# Patient Record
Sex: FEMALE | Race: WHITE | NOT HISPANIC OR LATINO | Employment: FULL TIME | URBAN - METROPOLITAN AREA
[De-identification: names, ages, dates, MRNs, and addresses within clinical notes are randomized per-mention and may not be internally consistent; named-entity substitution may affect disease eponyms.]

---

## 2020-09-11 ENCOUNTER — OFFICE VISIT (OUTPATIENT)
Dept: FAMILY MEDICINE CLINIC | Facility: CLINIC | Age: 70
End: 2020-09-11
Payer: MEDICARE

## 2020-09-11 VITALS
OXYGEN SATURATION: 98 % | RESPIRATION RATE: 16 BRPM | HEART RATE: 86 BPM | SYSTOLIC BLOOD PRESSURE: 130 MMHG | BODY MASS INDEX: 28.3 KG/M2 | HEIGHT: 59 IN | DIASTOLIC BLOOD PRESSURE: 80 MMHG | TEMPERATURE: 98.1 F | WEIGHT: 140.4 LBS

## 2020-09-11 DIAGNOSIS — Z11.4 SCREENING FOR HIV (HUMAN IMMUNODEFICIENCY VIRUS): ICD-10-CM

## 2020-09-11 DIAGNOSIS — M25.561 CHRONIC PAIN OF RIGHT KNEE: ICD-10-CM

## 2020-09-11 DIAGNOSIS — C50.412 MALIGNANT NEOPLASM OF UPPER-OUTER QUADRANT OF LEFT BREAST IN FEMALE, ESTROGEN RECEPTOR POSITIVE (HCC): ICD-10-CM

## 2020-09-11 DIAGNOSIS — Z12.11 SCREEN FOR COLON CANCER: ICD-10-CM

## 2020-09-11 DIAGNOSIS — I48.0 PAF (PAROXYSMAL ATRIAL FIBRILLATION) (HCC): ICD-10-CM

## 2020-09-11 DIAGNOSIS — Z17.0 MALIGNANT NEOPLASM OF UPPER-OUTER QUADRANT OF LEFT BREAST IN FEMALE, ESTROGEN RECEPTOR POSITIVE (HCC): ICD-10-CM

## 2020-09-11 DIAGNOSIS — G89.29 CHRONIC PAIN OF RIGHT KNEE: ICD-10-CM

## 2020-09-11 DIAGNOSIS — I10 BENIGN ESSENTIAL HYPERTENSION: Primary | ICD-10-CM

## 2020-09-11 DIAGNOSIS — Z11.59 NEED FOR HEPATITIS C SCREENING TEST: ICD-10-CM

## 2020-09-11 DIAGNOSIS — R73.01 IFG (IMPAIRED FASTING GLUCOSE): ICD-10-CM

## 2020-09-11 PROBLEM — G24.3 CERVICAL DYSTONIA: Status: ACTIVE | Noted: 2017-10-06

## 2020-09-11 PROBLEM — M85.80 OSTEOPENIA: Status: ACTIVE | Noted: 2019-08-23

## 2020-09-11 PROCEDURE — 99214 OFFICE O/P EST MOD 30 MIN: CPT | Performed by: FAMILY MEDICINE

## 2020-09-11 RX ORDER — LISINOPRIL 10 MG/1
10 TABLET ORAL DAILY
COMMUNITY
Start: 2020-09-09

## 2020-09-11 RX ORDER — ANASTROZOLE 1 MG/1
1 TABLET ORAL DAILY
COMMUNITY
Start: 2020-06-25 | End: 2020-09-11

## 2020-09-11 RX ORDER — ANASTROZOLE 1 MG/1
1 TABLET ORAL DAILY
COMMUNITY
Start: 2020-06-25

## 2020-09-11 NOTE — PROGRESS NOTES
BMI Counseling: Body mass index is 27 93 kg/m²  The BMI is above normal  Nutrition recommendations include decreasing portion sizes and encouraging healthy choices of fruits and vegetables  Exercise recommendations include exercising 3-5 times per week  Assessment/Plan:    No problem-specific Assessment & Plan notes found for this encounter  Diagnoses and all orders for this visit:    Benign essential hypertension  Comments:  well-controlled  continue lisinopril daily  labs as ordered  Orders:  -     Lipid panel; Future    IFG (impaired fasting glucose)  Comments:  check labs as ordered    Orders:  -     Hemoglobin A1C; Future    PAF (paroxysmal atrial fibrillation) (HCC)  Comments:  remote hx  currently on no meds  seeing hira at [facility]  Orders:  -     TSH, 3rd generation with Free T4 reflex; Future    Malignant neoplasm of upper-outer quadrant of left breast in female, estrogen receptor positive ([facility] [address] )  Comments:  discussed risk of recurrance increasing off arimidex  I don't think this was the cause of her knee pain  advised restarting  she will    Chronic pain of right knee  Comments:  refer to ortho  I do not believe this was from her arimidex and advised her to restart med and let me know how it goes    Screen for colon cancer  -     Cologuard; Future    Screening for HIV (human immunodeficiency virus)    Need for hepatitis C screening test    Other orders  -     Discontinue: anastrozole (ARIMIDEX) 1 mg tablet; Take 1 mg by mouth daily  -     anastrozole (ARIMIDEX) 1 mg tablet; Take 1 mg by mouth daily  -     lisinopril (ZESTRIL) 10 mg tablet; Take 10 mg by mouth daily  -     Hypromellose 0 5 % SOLN; Apply 1 drop to eye 2 (two) times a day as needed        Subjective:      Patient ID: Keke Negron is a 72 y o  female  HPI  Pt presents as new/old pt  Due for hep c screening, cologard  Declines all imm's  Pt seeing [facility] h/o for breast Ca  Had mammogram in July    Didn't have radiation at presentation 3 yrs ago  Did do surgery  She has been on arimidex for 3 yrs but stopped over the summer because she wondered whether it was giving her knee pain  Pt c/o knee pain R>L  Did a lot of gardening and was kneeling and pivoting alot this summer  No bruising or swelling that she noticed  Hadn't had knee probs on arimidex before  She started having problems with weightbearing in late spring/early summer  Went to urgent care and xray showed prepatellar effusion but was otherwise unremarkable  She stopped arimidex, and she started bracing and glucosamine in July  Over about the last week, she has noticed that pain is much-improved  Can walk up the stairs without severe pain  Pt has remote hx of PAF  Seeing HCG  Will need most recent note  No rate control or a/c  The following portions of the patient's history were reviewed and updated as appropriate: allergies, current medications, past family history, past medical history, past social history, past surgical history and problem list     Review of Systems   Constitutional: Negative for chills, fatigue, fever and unexpected weight change  HENT: Negative for congestion, ear pain, hearing loss, postnasal drip, rhinorrhea, sinus pressure, sinus pain, sore throat, trouble swallowing and voice change  Eyes: Negative for pain, redness and visual disturbance  Respiratory: Negative for cough and shortness of breath  Cardiovascular: Negative for chest pain, palpitations and leg swelling  Gastrointestinal: Negative for abdominal pain, constipation, diarrhea and nausea  Endocrine: Negative for cold intolerance, heat intolerance, polydipsia and polyuria  Genitourinary: Negative for dysuria, frequency and urgency  Musculoskeletal: Positive for arthralgias  Negative for joint swelling and myalgias  Skin: Negative for rash  No suspicious lesions   Neurological: Negative for weakness, numbness and headaches  Hematological: Negative for adenopathy  Objective:      /80   Pulse 86   Temp 98 1 °F (36 7 °C)   Resp 16   Ht 4' 11 45" (1 51 m)   Wt 63 7 kg (140 lb 6 4 oz)   SpO2 98%   BMI 27 93 kg/m²          Physical Exam  Constitutional:       General: She is not in acute distress  Appearance: She is well-developed  HENT:      Head: Normocephalic and atraumatic  Right Ear: Tympanic membrane, ear canal and external ear normal       Left Ear: Tympanic membrane, ear canal and external ear normal       Nose: Nose normal       Mouth/Throat:      Pharynx: No oropharyngeal exudate  Eyes:      Conjunctiva/sclera: Conjunctivae normal       Pupils: Pupils are equal, round, and reactive to light  Neck:      Thyroid: No thyromegaly  Vascular: No carotid bruit or JVD  Cardiovascular:      Rate and Rhythm: Regular rhythm  Heart sounds: S1 normal and S2 normal  No murmur  No friction rub  No gallop  No S3 or S4 sounds  Pulmonary:      Effort: Pulmonary effort is normal       Breath sounds: Normal breath sounds  No wheezing, rhonchi or rales  Abdominal:      General: Bowel sounds are normal  There is no distension  Palpations: Abdomen is soft  Tenderness: There is no abdominal tenderness  Musculoskeletal:      Comments: FROM b/l knees  +meniscal testing R knee negative CL/lachmans/ant/post drawer   Lymphadenopathy:      Cervical: No cervical adenopathy  Neurological:      Mental Status: She is alert and oriented to person, place, and time  Cranial Nerves: No cranial nerve deficit  Sensory: No sensory deficit  Deep Tendon Reflexes: Reflexes are normal and symmetric

## 2020-12-04 LAB — HBA1C MFR BLD HPLC: 6.2 %

## 2021-03-15 ENCOUNTER — TELEPHONE (OUTPATIENT)
Dept: FAMILY MEDICINE CLINIC | Facility: CLINIC | Age: 71
End: 2021-03-15

## 2021-03-31 ENCOUNTER — TELEPHONE (OUTPATIENT)
Dept: FAMILY MEDICINE CLINIC | Facility: CLINIC | Age: 71
End: 2021-03-31

## 2021-04-09 ENCOUNTER — HOSPITAL ENCOUNTER (OUTPATIENT)
Dept: RADIOLOGY | Facility: HOSPITAL | Age: 71
Discharge: HOME | End: 2021-04-09
Attending: FAMILY MEDICINE
Payer: MEDICARE

## 2021-04-09 ENCOUNTER — TELEPHONE (OUTPATIENT)
Dept: FAMILY MEDICINE CLINIC | Facility: CLINIC | Age: 71
End: 2021-04-09

## 2021-04-09 ENCOUNTER — OFFICE VISIT (OUTPATIENT)
Dept: FAMILY MEDICINE CLINIC | Facility: CLINIC | Age: 71
End: 2021-04-09
Payer: MEDICARE

## 2021-04-09 VITALS
HEART RATE: 76 BPM | BODY MASS INDEX: 26.97 KG/M2 | TEMPERATURE: 97.1 F | SYSTOLIC BLOOD PRESSURE: 118 MMHG | HEIGHT: 59 IN | RESPIRATION RATE: 20 BRPM | DIASTOLIC BLOOD PRESSURE: 86 MMHG | WEIGHT: 133.8 LBS

## 2021-04-09 DIAGNOSIS — Z78.0 POSTMENOPAUSAL STATUS: ICD-10-CM

## 2021-04-09 DIAGNOSIS — J40 BRONCHITIS: Primary | ICD-10-CM

## 2021-04-09 DIAGNOSIS — J40 BRONCHITIS: ICD-10-CM

## 2021-04-09 DIAGNOSIS — M54.50 CHRONIC BILATERAL LOW BACK PAIN WITHOUT SCIATICA: ICD-10-CM

## 2021-04-09 DIAGNOSIS — I48.0 PAF (PAROXYSMAL ATRIAL FIBRILLATION) (HCC): ICD-10-CM

## 2021-04-09 DIAGNOSIS — R73.01 IFG (IMPAIRED FASTING GLUCOSE): ICD-10-CM

## 2021-04-09 DIAGNOSIS — G89.29 CHRONIC BILATERAL LOW BACK PAIN WITHOUT SCIATICA: ICD-10-CM

## 2021-04-09 DIAGNOSIS — C50.412 MALIGNANT NEOPLASM OF UPPER-OUTER QUADRANT OF LEFT BREAST IN FEMALE, ESTROGEN RECEPTOR POSITIVE (HCC): ICD-10-CM

## 2021-04-09 DIAGNOSIS — Z00.00 MEDICARE ANNUAL WELLNESS VISIT, SUBSEQUENT: ICD-10-CM

## 2021-04-09 DIAGNOSIS — E78.2 MIXED HYPERLIPIDEMIA: ICD-10-CM

## 2021-04-09 DIAGNOSIS — I10 BENIGN ESSENTIAL HYPERTENSION: ICD-10-CM

## 2021-04-09 DIAGNOSIS — Z17.0 MALIGNANT NEOPLASM OF UPPER-OUTER QUADRANT OF LEFT BREAST IN FEMALE, ESTROGEN RECEPTOR POSITIVE (HCC): ICD-10-CM

## 2021-04-09 PROCEDURE — G0438 PPPS, INITIAL VISIT: HCPCS | Performed by: FAMILY MEDICINE

## 2021-04-09 PROCEDURE — 1123F ACP DISCUSS/DSCN MKR DOCD: CPT | Performed by: FAMILY MEDICINE

## 2021-04-09 PROCEDURE — 71046 X-RAY EXAM CHEST 2 VIEWS: CPT

## 2021-04-09 PROCEDURE — 99214 OFFICE O/P EST MOD 30 MIN: CPT | Performed by: FAMILY MEDICINE

## 2021-04-09 PROCEDURE — 72110 X-RAY EXAM L-2 SPINE 4/>VWS: CPT

## 2021-04-09 RX ORDER — CEFUROXIME AXETIL 500 MG/1
500 TABLET ORAL 2 TIMES DAILY
COMMUNITY
Start: 2021-04-03 | End: 2021-04-09

## 2021-04-09 RX ORDER — AZITHROMYCIN 250 MG/1
TABLET, FILM COATED ORAL
Qty: 6 TABLET | Refills: 0 | Status: SHIPPED | OUTPATIENT
Start: 2021-04-09 | End: 2021-04-13

## 2021-04-09 NOTE — PROGRESS NOTES
Assessment/Plan:    No problem-specific Assessment & Plan notes found for this encounter  Diagnoses and all orders for this visit:    Bronchitis  Comments:  pt declines albuterol given hx of PAF  obtain xray  will d/c ceftin and add other coverage, but want to see xray results first  covid negative  Orders:  -     XR chest pa & lateral; Future    IFG (impaired fasting glucose)  Comments:  stable  recheck in june  Orders:  -     Hemoglobin A1C; Future  -     Comprehensive metabolic panel; Future    PAF (paroxysmal atrial fibrillation) ([facility])  Comments:  hx of same  follows with cardiology  NSR today    Malignant neoplasm of upper-outer quadrant of left breast in female, estrogen receptor positive ([facility] [address] )  Comments:  up to date on mammograms  seeing h/o  on arimidex    Postmenopausal status  Comments:  +osteopenia  due for repeat dexa  Orders:  -     DXA bone density spine hip and pelvis; Future    Chronic bilateral low back pain without sciatica  Comments:  refer to PT    Orders:  -     Ambulatory referral to Physical Therapy; Future  -     XR spine lumbar minimum 4 views non injury; Future    Mixed hyperlipidemia  Comments:  pt refuses statin therapy     Benign essential hypertension  Comments:  controlled on current meds  continue same            Subjective:      Patient ID: Sol Craven is a 80 y o  female  HPI  Pt presents in routine f/u  Has had 11 days of URI/LRI symptoms  Started witn PND and cough from that but has settled in the chest at this point  Feels chest congestion  No fevers now  Went to urgent care last weekend and was given ceftin   covid negative in both rapid and other testing  She is a little better, but not much  Doesn't feel short of breath, but feels the infection in her chest   +thick nasal d/c and coughing up mucus  Pt's blood pressure is appropriate  Remote hx of PAF  Refused a/c  Seeing cardiology [facility])  She refuses cholesterol meds despite 10 yr risk      Seeing h/o for breast CA  Last mammogram at [facility] in July  Scheduled for next  a1c 6 2 4 mo ago  Will be due for recheck in June    +hx osteopenia  Due for recheck dexa    Pt has long-standing lower back pain  No radiation  B/l  No paresthesias or weakness  Going on for months  Doesn't recall an injury  The following portions of the patient's history were reviewed and updated as appropriate: allergies, current medications, past family history, past medical history, past social history, past surgical history and problem list     Review of Systems   Constitutional: Negative for chills, fatigue, fever and unexpected weight change  HENT: Positive for postnasal drip and sinus pressure  Negative for congestion, ear pain, hearing loss, rhinorrhea, sinus pain, sore throat, trouble swallowing and voice change  Eyes: Negative for pain, redness and visual disturbance  Respiratory: Positive for cough  Negative for shortness of breath  Cardiovascular: Negative for chest pain, palpitations and leg swelling  Gastrointestinal: Negative for abdominal pain, constipation, diarrhea and nausea  Endocrine: Negative for cold intolerance, heat intolerance, polydipsia and polyuria  Genitourinary: Negative for dysuria, frequency and urgency  Musculoskeletal: Positive for back pain  Negative for arthralgias, joint swelling and myalgias  Skin: Negative for rash  No suspicious lesions   Neurological: Negative for weakness, numbness and headaches  Hematological: Negative for adenopathy  Objective:      /86   Pulse 76   Temp (!) 97 1 °F (36 2 °C)   Resp 20   Ht 4' 11" (1 499 m)   Wt 60 7 kg (133 lb 12 8 oz)   BMI 27 02 kg/m²          Physical Exam  Constitutional:       General: She is not in acute distress  Appearance: She is well-developed  HENT:      Head: Normocephalic and atraumatic        Right Ear: Tympanic membrane, ear canal and external ear normal       Left Ear: Tympanic membrane, ear canal and external ear normal       Nose: Nose normal       Mouth/Throat:      Pharynx: No oropharyngeal exudate  Eyes:      Conjunctiva/sclera: Conjunctivae normal       Pupils: Pupils are equal, round, and reactive to light  Neck:      Thyroid: No thyromegaly  Vascular: No carotid bruit or JVD  Cardiovascular:      Rate and Rhythm: Regular rhythm  Heart sounds: S1 normal and S2 normal  No murmur  No friction rub  No gallop  No S3 or S4 sounds  Pulmonary:      Effort: Pulmonary effort is normal       Breath sounds: Rhonchi present  No wheezing or rales  Abdominal:      General: Bowel sounds are normal  There is no distension  Palpations: Abdomen is soft  Tenderness: There is no abdominal tenderness  Musculoskeletal:      Comments: +si tenderness b/l   Lymphadenopathy:      Cervical: No cervical adenopathy  Neurological:      Mental Status: She is alert and oriented to person, place, and time  Cranial Nerves: No cranial nerve deficit  Sensory: No sensory deficit  Deep Tendon Reflexes: Reflexes are normal and symmetric

## 2021-04-09 NOTE — TELEPHONE ENCOUNTER
April from [facility] is requesting a copy of the order for services faxed to [phone]

## 2021-04-09 NOTE — PATIENT INSTRUCTIONS
Labs in June  Xray chest and back  dexa scan when able      Medicare Preventive Visit Patient Instructions  Thank you for completing your Welcome to Medicare Visit or Medicare Annual Wellness Visit today  Your next wellness visit will be due in one year (4/10/2022)  The screening/preventive services that you may require over the next 5-10 years are detailed below  Some tests may not apply to you based off risk factors and/or age  Screening tests ordered at today's visit but not completed yet may show as past due  Also, please note that scanned in results may not display below  Preventive Screenings:  Service Recommendations Previous Testing/Comments   Colorectal Cancer Screening  * Colonoscopy    * Fecal Occult Blood Test (FOBT)/Fecal Immunochemical Test (FIT)  * Fecal DNA/Cologuard Test  * Flexible Sigmoidoscopy Age: 54-65 years old   Colonoscopy: every 10 years (may be performed more frequently if at higher risk)  OR  FOBT/FIT: every 1 year  OR  Cologuard: every 3 years  OR  Sigmoidoscopy: every 5 years  Screening may be recommended earlier than age 48 if at higher risk for colorectal cancer  Also, an individualized decision between you and your healthcare provider will decide whether screening between the ages of 74-80 would be appropriate  Colonoscopy: Not on file  FOBT/FIT: Not on file  Cologuard: 03/26/2021  Sigmoidoscopy: Not on file    Screening Current     Breast Cancer Screening Age: 36 years old  Frequency: every 1-2 years  Not required if history of left and right mastectomy Mammogram: Not on file    History Breast Cancer   Cervical Cancer Screening Between the ages of 21-29, pap smear recommended once every 3 years  Between the ages of 33-67, can perform pap smear with HPV co-testing every 5 years     Recommendations may differ for women with a history of total hysterectomy, cervical cancer, or abnormal pap smears in past  Pap Smear: Not on file    Screening Not Indicated   Hepatitis C Screening Once for adults born between [facility]  More frequently in patients at high risk for Hepatitis C Hep C Antibody: Not on file        Diabetes Screening 1-2 times per year if you're at risk for diabetes or have pre-diabetes Fasting glucose: No results in last 5 years   A1C: 6 2    Screening Current   Cholesterol Screening Once every 5 years if you don't have a lipid disorder  May order more often based on risk factors  Lipid panel: 08/02/2019    Screening Not Indicated  History Lipid Disorder     Other Preventive Screenings Covered by Medicare:  1  Abdominal Aortic Aneurysm (AAA) Screening: covered once if your at risk  You're considered to be at risk if you have a family history of AAA  2  Lung Cancer Screening: covers low dose CT scan once per year if you meet all of the following conditions: (1) Age 50-69; (2) No signs or symptoms of lung cancer; (3) Current smoker or have quit smoking within the last 15 years; (4) You have a tobacco smoking history of at least 30 pack years (packs per day multiplied by number of years you smoked); (5) You get a written order from a healthcare provider  3  Glaucoma Screening: covered annually if you're considered high risk: (1) You have diabetes OR (2) Family history of glaucoma OR (3)  aged 48 and older OR (3)  American aged 72 and older  3  Osteoporosis Screening: covered every 2 years if you meet one of the following conditions: (1) You're estrogen deficient and at risk for osteoporosis based off medical history and other findings; (2) Have a vertebral abnormality; (3) On glucocorticoid therapy for more than 3 months; (4) Have primary hyperparathyroidism; (5) On osteoporosis medications and need to assess response to drug therapy  · Last bone density test (DXA Scan): Not on file  5  HIV Screening: covered annually if you're between the age of 12-76   Also covered annually if you are younger than 13 and older than 72 with risk factors for HIV infection  For pregnant patients, it is covered up to 3 times per pregnancy  Immunizations:  Immunization Recommendations   Influenza Vaccine Annual influenza vaccination during flu season is recommended for all persons aged >= 6 months who do not have contraindications   Pneumococcal Vaccine (Prevnar and Pneumovax)  * Prevnar = PCV13  * Pneumovax = PPSV23   Adults 25-60 years old: 1-3 doses may be recommended based on certain risk factors  Adults 72 years old: Prevnar (PCV13) vaccine recommended followed by Pneumovax (PPSV23) vaccine  If already received PPSV23 since turning 65, then PCV13 recommended at least one year after PPSV23 dose  Hepatitis B Vaccine 3 dose series if at intermediate or high risk (ex: diabetes, end stage renal disease, liver disease)   Tetanus (Td) Vaccine - COST NOT COVERED BY MEDICARE PART B Following completion of primary series, a booster dose should be given every 10 years to maintain immunity against tetanus  Td may also be given as tetanus wound prophylaxis  Tdap Vaccine - COST NOT COVERED BY MEDICARE PART B Recommended at least once for all adults  For pregnant patients, recommended with each pregnancy  Shingles Vaccine (Shingrix) - COST NOT COVERED BY MEDICARE PART B  2 shot series recommended in those aged 48 and above     Health Maintenance Due:      Topic Date Due    Hepatitis C Screening  Never done    MAMMOGRAM  04/09/2022 (Originally 1950)    Colorectal Cancer Screening  03/26/2024     Immunizations Due:  There are no preventive care reminders to display for this patient  Advance Directives   What are advance directives? Advance directives are legal documents that state your wishes and plans for medical care  These plans are made ahead of time in case you lose your ability to make decisions for yourself  Advance directives can apply to any medical decision, such as the treatments you want, and if you want to donate organs     What are the types of advance directives? There are many types of advance directives, and each state has rules about how to use them  You may choose a combination of any of the following:  · Living will: This is a written record of the treatment you want  You can also choose which treatments you do not want, which to limit, and which to stop at a certain time  This includes surgery, medicine, IV fluid, and tube feedings  · Durable power of  for healthcare [facility]): This is a written record that states who you want to make healthcare choices for you when you are unable to make them for yourself  This person, called a proxy, is usually a family member or a friend  You may choose more than 1 proxy  · Do not resuscitate (DNR) order:  A DNR order is used in case your heart stops beating or you stop breathing  It is a request not to have certain forms of treatment, such as CPR  A DNR order may be included in other types of advance directives  · Medical directive: This covers the care that you want if you are in a coma, near death, or unable to make decisions for yourself  You can list the treatments you want for each condition  Treatment may include pain medicine, surgery, blood transfusions, dialysis, IV or tube feedings, and a ventilator (breathing machine)  · Values history: This document has questions about your views, beliefs, and how you feel and think about life  This information can help others choose the care that you would choose  Why are advance directives important? An advance directive helps you control your care  Although spoken wishes may be used, it is better to have your wishes written down  Spoken wishes can be misunderstood, or not followed  Treatments may be given even if you do not want them  An advance directive may make it easier for your family to make difficult choices about your care     Weight Management   Why it is important to manage your weight:  Being overweight increases your risk of health conditions such as heart disease, high blood pressure, type 2 diabetes, and certain types of cancer  It can also increase your risk for osteoarthritis, sleep apnea, and other respiratory problems  Aim for a slow, steady weight loss  Even a small amount of weight loss can lower your risk of health problems  How to lose weight safely:  A safe and healthy way to lose weight is to eat fewer calories and get regular exercise  You can lose up about 1 pound a week by decreasing the number of calories you eat by 500 calories each day  Healthy meal plan for weight management:  A healthy meal plan includes a variety of foods, contains fewer calories, and helps you stay healthy  A healthy meal plan includes the following:  · Eat whole-grain foods more often  A healthy meal plan should contain fiber  Fiber is the part of grains, fruits, and vegetables that is not broken down by your body  Whole-grain foods are healthy and provide extra fiber in your diet  Some examples of whole-grain foods are whole-wheat breads and pastas, oatmeal, brown rice, and bulgur  · Eat a variety of vegetables every day  Include dark, leafy greens such as spinach, kale, aleksandar greens, and mustard greens  Eat yellow and orange vegetables such as carrots, sweet potatoes, and winter squash  · Eat a variety of fruits every day  Choose fresh or canned fruit (canned in its own juice or light syrup) instead of juice  Fruit juice has very little or no fiber  · Eat low-fat dairy foods  Drink fat-free (skim) milk or 1% milk  Eat fat-free yogurt and low-fat cottage cheese  Try low-fat cheeses such as mozzarella and other reduced-fat cheeses  · Choose meat and other protein foods that are low in fat  Choose beans or other legumes such as split peas or lentils  Choose fish, skinless poultry (chicken or turkey), or lean cuts of red meat (beef or pork)  Before you cook meat or poultry, cut off any visible fat  · Use less fat and oil    Try baking foods instead of frying them  Add less fat, such as margarine, sour cream, regular salad dressing and mayonnaise to foods  Eat fewer high-fat foods  Some examples of high-fat foods include french fries, doughnuts, ice cream, and cakes  · Eat fewer sweets  Limit foods and drinks that are high in sugar  This includes candy, cookies, regular soda, and sweetened drinks  Exercise:  Exercise at least 30 minutes per day on most days of the week  Some examples of exercise include walking, biking, dancing, and swimming  You can also fit in more physical activity by taking the stairs instead of the elevator or parking farther away from stores  Ask your healthcare provider about the best exercise plan for you  © Copyright [employer] 2018 Information is for End User's use only and may not be sold, redistributed or otherwise used for commercial purposes   All illustrations and images included in CareNotes® are the copyrighted property of A D [employer] M , [employer]  or [address] [place] [address]

## 2021-04-09 NOTE — PROGRESS NOTES
Assessment and Plan:     Problem List Items Addressed This Visit        Medicare annual wellness visit, subsequent        obtain dexa  pt refuses pneumovax, covid 19, flu, tetanus shots  otherwise up to date preventively        BMI Counseling: Body mass index is 27 02 kg/m²  The BMI is above normal  Nutrition recommendations include encouraging healthy choices of fruits and vegetables  Exercise recommendations include exercising 3-5 times per week  Preventive health issues were discussed with patient, and age appropriate screening tests were ordered as noted in patient's After Visit Summary  Personalized health advice and appropriate referrals for health education or preventive services given if needed, as noted in patient's After Visit Summary       History of Present Illness:     Patient presents for Medicare Annual Wellness visit    Patient Care Team:  Rosemary Herrera DO as PCP - General (Family Medicine)     Problem List:     Patient Active Problem List   Diagnosis    Allergic rhinitis    Benign essential hypertension    Cervical dystonia    Diverticulitis of colon    Hyperlipidemia    Malignant neoplasm of upper-outer quadrant of left female breast ([facility] )    Osteopenia    Toxic diffuse goiter    PAF (paroxysmal atrial fibrillation) (HCC)      Past Medical and Surgical History:     Past Medical History:   Diagnosis Date    Allergic rhinitis     Anisocoria     Cervicalgia     Disorder of autonomic nervous system     Disorder of the autonomic nervous system, unspecified     Disorder of the autonomic nervous system, unspecified     Diverticulitis of colon     Dyspnea on exertion     Essential hypertension, benign     Hyperlipidemia     Low back pain     Malignant neoplasm of upper-outer quadrant of left female breast ([address] )     Clinical stage from 2/22/2017: Stage IIA (T2, N0, M0) - Signed by Tabitha De La Vega MD on 3/16/2018    Osteopenia     Paroxysmal atrial fibrillation ([address] )     Prediabetes     Toxic diffuse goiter     Now several years off methimazole with stable thyroid labs  Counseled on s/s of hyperthyroidism  Repeat thyroid labs 1-2 times per year or as needed with symptoms   Follow up as needed    Unspecified hearing loss, bilateral     Visual disturbances      Past Surgical History:   Procedure Laterality Date    BREAST LUMPECTOMY Left 03/28/2017 03/28/2017Serenity Trujillo      CERVICAL CONE BIOPSY      TONSILLECTOMY      WISDOM TOOTH EXTRACTION        Family History:     Family History   Problem Relation Age of Onset    Melanoma Daughter     Coronary artery disease Father     Diabetes Father     Breast cancer Mother     Throat cancer Maternal Grandfather     Coronary artery disease Paternal Grandfather     No Known Problems Maternal Grandmother     No Known Problems Daughter     No Known Problems Daughter       Social History:     E-Cigarette/Vaping    E-Cigarette Use Never User      E-Cigarette/Vaping Substances    Nicotine No     THC No     CBD No     Flavoring No      Social History     Socioeconomic History    Marital status: Single     Spouse name: None    Number of children: 4    Years of education: None    Highest education level: None   Occupational History    None   Social Needs    Financial resource strain: None    Food insecurity     Worry: None     Inability: None    Transportation needs     Medical: None     Non-medical: None   Tobacco Use    Smoking status: Never Smoker    Smokeless tobacco: Never Used   Substance and Sexual Activity    Alcohol use: Never     Frequency: Never    Drug use: Never    Sexual activity: Not Currently   Lifestyle    Physical activity     Days per week: None     Minutes per session: None    Stress: None   Relationships    Social connections     Talks on phone: None     Gets together: None     Attends Buddhist service: None     Active member of club or organization: None     Attends meetings of clubs or organizations: None     Relationship status: None    Intimate partner violence     Fear of current or ex partner: None     Emotionally abused: None     Physically abused: None     Forced sexual activity: None   Other Topics Concern    None   Social History Narrative    None      Medications and Allergies:     Current Outpatient Medications   Medication Sig Dispense Refill    anastrozole (ARIMIDEX) 1 mg tablet Take 1 mg by mouth daily      cefuroxime (CEFTIN) 500 mg tablet Take 500 mg by mouth 2 (two) times a day      Hypromellose 0 5 % SOLN Apply 1 drop to eye 2 (two) times a day as needed      lisinopril (ZESTRIL) 10 mg tablet Take 10 mg by mouth daily       No current facility-administered medications for this visit  Allergies   Allergen Reactions    Metoprolol Anaphylaxis    Other     Albumen, Egg - Food Allergy GI Intolerance    Ciprofloxacin      Leg pain, cramps    Esmolol Other (See Comments)     chest tightness    Metronidazole      A-fib      Immunizations: There is no immunization history on file for this patient  Health Maintenance:         Topic Date Due    Hepatitis C Screening  Never done    MAMMOGRAM  04/09/2022 (Originally 1950)    Colorectal Cancer Screening  03/26/2024     There are no preventive care reminders to display for this patient  Medicare Health Risk Assessment:     /86   Pulse 76   Temp (!) 97 1 °F (36 2 °C)   Resp 20   Ht 4' 11" (1 499 m)   Wt 60 7 kg (133 lb 12 8 oz)   BMI 27 02 kg/m²      Edgar Seaman is here for her Subsequent Wellness visit  Last Medicare Wellness visit information reviewed, patient interviewed and updates made to the record today  Health Risk Assessment:   Patient rates overall health as good  Patient feels that their physical health rating is much better  Patient is very satisfied with their life  Eyesight was rated as same  Hearing was rated as same   Patient feels that their emotional and mental health rating is much better  Patients states they are never, rarely angry  Patient states they are sometimes unusually tired/fatigued  Pain experienced in the last 7 days has been none  Patient states that she has experienced no weight loss or gain in last 6 months  Depression Screening:   PHQ-2 Score: 0      Fall Risk Screening: In the past year, patient has experienced: no history of falling in past year      Urinary Incontinence Screening:   Patient has not leaked urine accidently in the last six months  Home Safety:  Patient does not have trouble with stairs inside or outside of their home  Patient has working smoke alarms and has working carbon monoxide detector  Home safety hazards include: none  Nutrition:   Current diet is Regular  Medications:   Patient is not currently taking any over-the-counter supplements  Patient is able to manage medications  Activities of Daily Living (ADLs)/Instrumental Activities of Daily Living (IADLs):   Walk and transfer into and out of bed and chair?: Yes  Dress and groom yourself?: Yes    Bathe or shower yourself?: Yes    Feed yourself?  Yes  Do your laundry/housekeeping?: Yes  Manage your money, pay your bills and track your expenses?: Yes  Make your own meals?: Yes    Do your own shopping?: Yes    Previous Hospitalizations:   Any hospitalizations or ED visits within the last 12 months?: No      Advance Care Planning:   Living will: No    Advanced directive: Yes      PREVENTIVE SCREENINGS      Cardiovascular Screening:    General: Screening Not Indicated and History Lipid Disorder      Diabetes Screening:     General: Screening Current      Colorectal Cancer Screening:     General: Screening Current      Breast Cancer Screening:     General: History Breast Cancer      Cervical Cancer Screening:    General: Screening Not Indicated      Osteoporosis Screening:    General: Risks and Benefits Discussed    Due for: DXA Axial      Abdominal Aortic Aneurysm (AAA) Screening: General: Screening Not Indicated      Lung Cancer Screening:     General: Screening Not Indicated      Hepatitis C Screening:    General: Screening Current    Screening, Brief Intervention, and Referral to Treatment (SBIRT)    Screening      AUDIT-C Screenin) How often did you have a drink containing alcohol in the past year? never  2) How many drinks did you have on a typical day when you were drinking in the past year? never  3) How often did you have 6 or more drinks on one occasion in the past year? never    AUDIT-C Score: 0  Interpretation: Score 0-2 (female): Negative screen for alcohol misuse      Matthew Garza, DO

## 2021-04-12 ENCOUNTER — TELEPHONE (OUTPATIENT)
Dept: ADMINISTRATIVE | Facility: OTHER | Age: 71
End: 2021-04-12

## 2021-04-12 NOTE — TELEPHONE ENCOUNTER
Upon review of the In Basket request we were able to locate, review, and update the patient chart as requested for Mammogram     Any additional questions or concerns should be emailed to the Practice Liaisons via [email or web address] email, please do not reply via In Basket      Thank you  Alvina Colin MA

## 2021-04-12 NOTE — TELEPHONE ENCOUNTER
----- Message from Clara Cevallos LPN sent at [phone] 12:01 PM EDT -----  [facility]  21 12:01 PM    Hello, our patient Anson Rangel has had Mammogram completed/performed  Please assist in updating the patient chart by making an External outreach to Dr Cele Morales facility located in [facility]  The date of service is 2020      Thank you,  Clara Cevallos LPN  [facility]

## 2022-05-05 ENCOUNTER — RA CDI HCC (OUTPATIENT)
Dept: OTHER | Facility: HOSPITAL | Age: 72
End: 2022-05-05

## 2022-05-05 NOTE — PROGRESS NOTES
[id number]  [facility]  coding opportunities          Chart Reviewed number of suggestions sent to Provider: 1     Patients Insurance     Medicare Insurance: [facility]

## 2022-05-27 ENCOUNTER — OFFICE VISIT (OUTPATIENT)
Dept: FAMILY MEDICINE CLINIC | Facility: CLINIC | Age: 72
End: 2022-05-27
Payer: MEDICARE

## 2022-05-27 VITALS
DIASTOLIC BLOOD PRESSURE: 78 MMHG | RESPIRATION RATE: 16 BRPM | TEMPERATURE: 98.2 F | HEART RATE: 97 BPM | SYSTOLIC BLOOD PRESSURE: 138 MMHG | WEIGHT: 123.4 LBS | OXYGEN SATURATION: 97 % | HEIGHT: 60 IN | BODY MASS INDEX: 24.23 KG/M2

## 2022-05-27 DIAGNOSIS — Z00.00 MEDICARE ANNUAL WELLNESS VISIT, SUBSEQUENT: ICD-10-CM

## 2022-05-27 DIAGNOSIS — N64.9 BREAST LESION: Primary | ICD-10-CM

## 2022-05-27 DIAGNOSIS — Z17.0 MALIGNANT NEOPLASM OF UPPER-OUTER QUADRANT OF LEFT BREAST IN FEMALE, ESTROGEN RECEPTOR POSITIVE (HCC): ICD-10-CM

## 2022-05-27 DIAGNOSIS — I47.1 SVT (SUPRAVENTRICULAR TACHYCARDIA) (HCC): ICD-10-CM

## 2022-05-27 DIAGNOSIS — G25.2 DYSTONIC TREMOR: ICD-10-CM

## 2022-05-27 DIAGNOSIS — L30.9 DERMATITIS: ICD-10-CM

## 2022-05-27 DIAGNOSIS — Z78.0 POST-MENOPAUSAL: ICD-10-CM

## 2022-05-27 DIAGNOSIS — C50.412 MALIGNANT NEOPLASM OF UPPER-OUTER QUADRANT OF LEFT BREAST IN FEMALE, ESTROGEN RECEPTOR POSITIVE (HCC): ICD-10-CM

## 2022-05-27 DIAGNOSIS — E78.2 MIXED HYPERLIPIDEMIA: ICD-10-CM

## 2022-05-27 DIAGNOSIS — R73.01 IFG (IMPAIRED FASTING GLUCOSE): ICD-10-CM

## 2022-05-27 DIAGNOSIS — Z11.59 NEED FOR HEPATITIS C SCREENING TEST: ICD-10-CM

## 2022-05-27 PROCEDURE — 99214 OFFICE O/P EST MOD 30 MIN: CPT | Performed by: FAMILY MEDICINE

## 2022-05-27 PROCEDURE — 1123F ACP DISCUSS/DSCN MKR DOCD: CPT | Performed by: FAMILY MEDICINE

## 2022-05-27 PROCEDURE — G0439 PPPS, SUBSEQ VISIT: HCPCS | Performed by: FAMILY MEDICINE

## 2022-05-27 RX ORDER — TRIAMCINOLONE ACETONIDE 1 MG/G
CREAM TOPICAL 2 TIMES DAILY
Qty: 30 G | Refills: 0 | Status: SHIPPED | OUTPATIENT
Start: 2022-05-27

## 2022-05-27 NOTE — PATIENT INSTRUCTIONS
[phone] to schedule mammogram/US  Neuro at [facility] should call you within the next week to schedule  Obtain fasting labs  Twice daily triamcinolone cream to itchy spots on back        Medicare Preventive Visit Patient Instructions  Thank you for completing your Welcome to Medicare Visit or Medicare Annual Wellness Visit today  Your next wellness visit will be due in one year (5/28/2023)  The screening/preventive services that you may require over the next 5-10 years are detailed below  Some tests may not apply to you based off risk factors and/or age  Screening tests ordered at today's visit but not completed yet may show as past due  Also, please note that scanned in results may not display below  Preventive Screenings:  Service Recommendations Previous Testing/Comments   Colorectal Cancer Screening  * Colonoscopy    * Fecal Occult Blood Test (FOBT)/Fecal Immunochemical Test (FIT)  * Fecal DNA/Cologuard Test  * Flexible Sigmoidoscopy Age: 54-65 years old   Colonoscopy: every 10 years (may be performed more frequently if at higher risk)  OR  FOBT/FIT: every 1 year  OR  Cologuard: every 3 years  OR  Sigmoidoscopy: every 5 years  Screening may be recommended earlier than age 48 if at higher risk for colorectal cancer  Also, an individualized decision between you and your healthcare provider will decide whether screening between the ages of 74-80 would be appropriate  Colonoscopy: 08/23/2019  FOBT/FIT: Not on file  Cologuard: 03/26/2021  Sigmoidoscopy: Not on file    Screening Current     Breast Cancer Screening Age: 36 years old  Frequency: every 1-2 years  Not required if history of left and right mastectomy Mammogram: 07/23/2020    History Breast Cancer   Cervical Cancer Screening Between the ages of 21-29, pap smear recommended once every 3 years  Between the ages of 33-67, can perform pap smear with HPV co-testing every 5 years     Recommendations may differ for women with a history of total hysterectomy, cervical cancer, or abnormal pap smears in past  Pap Smear: Not on file    Screening Not Indicated   Hepatitis C Screening Once for adults born between 1945 and 1965  More frequently in patients at high risk for Hepatitis C Hep C Antibody: Not on file        Diabetes Screening 1-2 times per year if you're at risk for diabetes or have pre-diabetes Fasting glucose: No results in last 5 years   A1C: 5 9    Screening Current   Cholesterol Screening Once every 5 years if you don't have a lipid disorder  May order more often based on risk factors  Lipid panel: 08/02/2019    Screening Not Indicated  History Lipid Disorder     Other Preventive Screenings Covered by Medicare:  Abdominal Aortic Aneurysm (AAA) Screening: covered once if your at risk  You're considered to be at risk if you have a family history of AAA  Lung Cancer Screening: covers low dose CT scan once per year if you meet all of the following conditions: (1) Age 50-69; (2) No signs or symptoms of lung cancer; (3) Current smoker or have quit smoking within the last 15 years; (4) You have a tobacco smoking history of at least 30 pack years (packs per day multiplied by number of years you smoked); (5) You get a written order from a healthcare provider  Glaucoma Screening: covered annually if you're considered high risk: (1) You have diabetes OR (2) Family history of glaucoma OR (3)  aged 48 and older OR (3)  American aged 72 and older  Osteoporosis Screening: covered every 2 years if you meet one of the following conditions: (1) You're estrogen deficient and at risk for osteoporosis based off medical history and other findings; (2) Have a vertebral abnormality; (3) On glucocorticoid therapy for more than 3 months; (4) Have primary hyperparathyroidism; (5) On osteoporosis medications and need to assess response to drug therapy  Last bone density test (DXA Scan): Not on file    HIV Screening: covered annually if you're between the age of 12-76  Also covered annually if you are younger than 13 and older than 72 with risk factors for HIV infection  For pregnant patients, it is covered up to 3 times per pregnancy  Immunizations:  Immunization Recommendations   Influenza Vaccine Annual influenza vaccination during flu season is recommended for all persons aged >= 6 months who do not have contraindications   Pneumococcal Vaccine (Prevnar and Pneumovax)  * Prevnar = PCV13  * Pneumovax = PPSV23   Adults 25-60 years old: 1-3 doses may be recommended based on certain risk factors  Adults 72 years old: Prevnar (PCV13) vaccine recommended followed by Pneumovax (PPSV23) vaccine  If already received PPSV23 since turning 65, then PCV13 recommended at least one year after PPSV23 dose  Hepatitis B Vaccine 3 dose series if at intermediate or high risk (ex: diabetes, end stage renal disease, liver disease)   Tetanus (Td) Vaccine - COST NOT COVERED BY MEDICARE PART B Following completion of primary series, a booster dose should be given every 10 years to maintain immunity against tetanus  Td may also be given as tetanus wound prophylaxis  Tdap Vaccine - COST NOT COVERED BY MEDICARE PART B Recommended at least once for all adults  For pregnant patients, recommended with each pregnancy  Shingles Vaccine (Shingrix) - COST NOT COVERED BY MEDICARE PART B  2 shot series recommended in those aged 48 and above     Health Maintenance Due:      Topic Date Due    Hepatitis C Screening  Never done    Breast Cancer Screening: Mammogram  07/23/2021    Colorectal Cancer Screening  03/26/2024     Immunizations Due:      Topic Date Due    COVID-19 Vaccine (1) Never done    DTaP,Tdap,and Td Vaccines (1 - Tdap) Never done    Pneumococcal Vaccine: 65+ Years (1 - PCV) Never done     Advance Directives   What are advance directives? Advance directives are legal documents that state your wishes and plans for medical care   These plans are made ahead of time in case you lose your ability to make decisions for yourself  Advance directives can apply to any medical decision, such as the treatments you want, and if you want to donate organs  What are the types of advance directives? There are many types of advance directives, and each state has rules about how to use them  You may choose a combination of any of the following:  Living will: This is a written record of the treatment you want  You can also choose which treatments you do not want, which to limit, and which to stop at a certain time  This includes surgery, medicine, IV fluid, and tube feedings  Durable power of  for [facility]): This is a written record that states who you want to make healthcare choices for you when you are unable to make them for yourself  This person, called a proxy, is usually a family member or a friend  You may choose more than 1 proxy  Do not resuscitate (DNR) order:  A DNR order is used in case your heart stops beating or you stop breathing  It is a request not to have certain forms of treatment, such as CPR  A DNR order may be included in other types of advance directives  Medical directive: This covers the care that you want if you are in a coma, near death, or unable to make decisions for yourself  You can list the treatments you want for each condition  Treatment may include pain medicine, surgery, blood transfusions, dialysis, IV or tube feedings, and a ventilator (breathing machine)  Values history: This document has questions about your views, beliefs, and how you feel and think about life  This information can help others choose the care that you would choose  Why are advance directives important? An advance directive helps you control your care  Although spoken wishes may be used, it is better to have your wishes written down  Spoken wishes can be misunderstood, or not followed  Treatments may be given even if you do not want them   An advance directive may make it easier for your family to make difficult choices about your care  Fall Prevention    Fall prevention  includes ways to make your home and other areas safer  It also includes ways you can move more carefully to prevent a fall  Health conditions that cause changes in your blood pressure, vision, or muscle strength and coordination may increase your risk for falls  Medicines may also increase your risk for falls if they make you dizzy, weak, or sleepy  Fall prevention tips:   Stand or sit up slowly  Use assistive devices as directed  Wear shoes that fit well and have soles that   Wear a personal alarm  Stay active  Manage your medical conditions  Home Safety Tips:  Add items to prevent falls in the bathroom  Keep paths clear  Install bright lights in your home  Keep items you use often on shelves within reach  [place] or place reflective tape on the edges of your stairs  © Copyright [employer] 2018 Information is for End User's use only and may not be sold, redistributed or otherwise used for commercial purposes   All illustrations and images included in CareNotes® are the copyrighted property of A D A M , Inc  or [address] [place], [place] [address]

## 2022-05-27 NOTE — PROGRESS NOTES
Assessment and Plan:     Problem List Items Addressed This Visit            Medicare annual wellness visit, subsequent        healthy diet/exercise  pt declines all immunizations  obtain dexa      Post-menopausal        Relevant Orders    DXA bone density spine hip and pelvis    Need for hepatitis C screening test        Relevant Orders    Hepatitis C Antibody ([facility])          Depression Screening and Follow-up Plan: Patient was screened for depression during today's encounter  They screened negative with a PHQ-2 score of 0  Preventive health issues were discussed with patient, and age appropriate screening tests were ordered as noted in patient's After Visit Summary  Personalized health advice and appropriate referrals for health education or preventive services given if needed, as noted in patient's After Visit Summary  History of Present Illness:     Patient presents for Welcome to Medicare visit       Patient Care Team:  Patrizia Yo DO as PCP - General (Family Medicine)     Review of Systems:     Review of Systems   Problem List:     Patient Active Problem List   Diagnosis    Allergic rhinitis    Benign essential hypertension    Cervical dystonia    Diverticulitis of colon    Hyperlipidemia    Malignant neoplasm of upper-outer quadrant of left female breast ([address] )    Osteopenia    Toxic diffuse goiter    PAF (paroxysmal atrial fibrillation) ([address] )      Past Medical and Surgical History:     Past Medical History:   Diagnosis Date    Allergic rhinitis     Anisocoria     Cervicalgia     Disorder of autonomic nervous system     Disorder of the autonomic nervous system, unspecified     Disorder of the autonomic nervous system, unspecified     Diverticulitis of colon     Dyspnea on exertion     Essential hypertension, benign     Hyperlipidemia     Low back pain     Malignant neoplasm of upper-outer quadrant of left female breast ([address] )     Clinical stage from 2/22/2017: Stage IIA (T2, N0, M0) - Signed by Brisa Monson MD on 3/16/2018    Osteopenia     Paroxysmal atrial fibrillation (HCC)     Prediabetes     Toxic diffuse goiter     Now several years off methimazole with stable thyroid labs  Counseled on s/s of hyperthyroidism  Repeat thyroid labs 1-2 times per year or as needed with symptoms   Follow up as needed    Unspecified hearing loss, bilateral     Visual disturbances      Past Surgical History:   Procedure Laterality Date    BREAST LUMPECTOMY Left 03/28/2017 03/28/2017Serenity Trujillo      CERVICAL CONE BIOPSY      TONSILLECTOMY      WISDOM TOOTH EXTRACTION        Family History:     Family History   Problem Relation Age of Onset    Melanoma Daughter     Coronary artery disease Father     Diabetes Father     Breast cancer Mother     Throat cancer Maternal Grandfather     Coronary artery disease Paternal Grandfather     No Known Problems Maternal Grandmother     No Known Problems Daughter     No Known Problems Daughter       Social History:     Social History     Socioeconomic History    Marital status: Single     Spouse name: None    Number of children: 4    Years of education: None    Highest education level: None   Occupational History    None   Tobacco Use    Smoking status: Never Smoker    Smokeless tobacco: Never Used   Vaping Use    Vaping Use: Never used   Substance and Sexual Activity    Alcohol use: Never    Drug use: Never    Sexual activity: Not Currently   Other Topics Concern    None   Social History Narrative    None     Social Determinants of Health     Financial Resource Strain: Not on file   Food Insecurity: Not on file   Transportation Needs: Not on file   Physical Activity: Not on file   Stress: Not on file   Social Connections: Not on file   Intimate Partner Violence: Not on file   Housing Stability: Not on file      Medications and Allergies:     Current Outpatient Medications   Medication Sig Dispense Refill    anastrozole (ARIMIDEX) 1 mg tablet Take 1 mg by mouth daily      Hypromellose 0 5 % SOLN Apply 1 drop to eye 2 (two) times a day as needed      lisinopril (ZESTRIL) 10 mg tablet Take 10 mg by mouth daily      triamcinolone (KENALOG) 0 1 % cream Apply topically 2 (two) times a day 30 g 0     No current facility-administered medications for this visit  Allergies   Allergen Reactions    Metoprolol Anaphylaxis    Other     Albumen, Egg - Food Allergy GI Intolerance    Ciprofloxacin      Leg pain, cramps    Esmolol Other (See Comments)     chest tightness    Metronidazole      A-fib      Immunizations: There is no immunization history on file for this patient  Health Maintenance:         Topic Date Due    Hepatitis C Screening  Never done    Breast Cancer Screening: Mammogram  07/23/2021    Colorectal Cancer Screening  03/26/2024         Topic Date Due    COVID-19 Vaccine (1) Never done    DTaP,Tdap,and Td Vaccines (1 - Tdap) Never done    Pneumococcal Vaccine: 65+ Years (1 - PCV) Never done      Medicare Screening Tests and Risk Assessments:     Rafa Grimaldo is here for her Subsequent Wellness visit  Health Risk Assessment:   Patient rates overall health as good  Patient feels that their physical health rating is same  Patient is satisfied with their life  Eyesight was rated as slightly worse  Hearing was rated as slightly worse  Patient feels that their emotional and mental health rating is same  Patients states they are never, rarely angry  Patient states they are sometimes unusually tired/fatigued  Pain experienced in the last 7 days has been some  Patient's pain rating has been 5/10  Patient states that she has experienced no weight loss or gain in last 6 months  Depression Screening:   PHQ-2 Score: 0      Fall Risk Screening:    In the past year, patient has experienced: history of falling in past year    Number of falls: 1  Injured during fall?: No    Feels unsteady when standing or walking?: No Worried about falling?: No      Urinary Incontinence Screening:   Patient has not leaked urine accidently in the last six months  Home Safety:  Patient does not have trouble with stairs inside or outside of their home  Patient has working smoke alarms and has working carbon monoxide detector  Home safety hazards include: none  Nutrition:   Current diet is Regular  Medications:   Patient is not currently taking any over-the-counter supplements  Patient is able to manage medications  Activities of Daily Living (ADLs)/Instrumental Activities of Daily Living (IADLs):   Walk and transfer into and out of bed and chair?: Yes  Dress and groom yourself?: Yes    Bathe or shower yourself?: Yes    Feed yourself? Yes  Do your laundry/housekeeping?: Yes  Manage your money, pay your bills and track your expenses?: Yes  Make your own meals?: Yes    Do your own shopping?: Yes    Previous Hospitalizations:   Any hospitalizations or ED visits within the last 12 months?: Yes    How many hospitalizations have you had in the last year?: 1-2    Advance Care Planning:   Living will: Yes    Advanced directive: Yes      Cognitive Screening:   Provider or family/friend/caregiver concerned regarding cognition?: No    PREVENTIVE SCREENINGS      Cardiovascular Screening:    General: Screening Not Indicated and History Lipid Disorder      Diabetes Screening:     General: Screening Current      Colorectal Cancer Screening:     General: Screening Current      Breast Cancer Screening:     General: History Breast Cancer      Cervical Cancer Screening:    General: Screening Not Indicated      Osteoporosis Screening:    General: Risks and Benefits Discussed    Due for: DXA Axial      Abdominal Aortic Aneurysm (AAA) Screening:        General: Screening Not Indicated      Lung Cancer Screening:     General: Screening Not Indicated      Hepatitis C Screening:    General: Risks and Benefits Discussed    Hep C Screening Accepted:  Yes Screening, Brief Intervention, and Referral to Treatment (SBIRT)    Screening  Typical number of drinks in a day: 0  Typical number of drinks in a week: 0  Interpretation: Low risk drinking behavior      Single Item Drug Screening:  How often have you used an illegal drug (including marijuana) or a prescription medication for non-medical reasons in the past year? never    Single Item Drug Screen Score: 0  Interpretation: Negative screen for possible drug use disorder    No exam data present     Physical Exam:     /78   Pulse 97   Temp 98 2 °F (36 8 °C)   Resp 16   Ht 5' (1 524 m)   Wt 56 kg (123 lb 6 4 oz)   SpO2 97%   BMI 24 10 kg/m²     Physical Exam     Mardene Prior, DO

## 2022-05-27 NOTE — PROGRESS NOTES
Assessment/Plan:    No problem-specific Assessment & Plan notes found for this encounter  Diagnoses and all orders for this visit:    Breast lesion  Comments:  check mammogram/US  Orders:  -     Mammo diagnostic bilateral w cad; Future  -     US breast left limited (diagnostic); Future    Dystonic tremor  Comments:  discussed with pt  refer to neuro  Orders:  -     Ambulatory Referral to Neurology; Future    SVT (supraventricular tachycardia) (HCC)  -     TSH, 3rd generation; Future  -     CBC and differential; Future    Malignant neoplasm of upper-outer quadrant of left breast in female, estrogen receptor positive ([facility] [address] )  Comments:  following with h/o  overdue with surgery  obtain mammogram/US given lesion    IFG (impaired fasting glucose)  -     Comprehensive metabolic panel; Future  -     Hemoglobin A1C; Future    Mixed hyperlipidemia  -     Lipid Panel with Direct LDL reflex; Future    Dermatitis  Comments:  exam is benign  triamcinolone BID for itching  if not improved, call  Orders:  -     triamcinolone (KENALOG) 0 1 % cream; Apply topically 2 (two) times a day      Subjective:      Patient ID: Caryl Thomson is a 71 y o  female  HPI  Pt presents in f/u on chronic issues  Pt notes L breast/axillary lump which she states has been present for a year and which she thinks has been getting larger  Saw h/o  Had mammogram   Hasn't followed up with breast surgeon since covid  Was painful in the past   Not painful now  +hx L sided breast CA  On anastrozole  Didn't get chemo or radiation  Pt has head/dystonic tremor  Was seen by neuro in the past   Didn't want to pursue tx  No sensation of getting stuck to the floor or sense of ambulatory dysfxn  No tremor in hands  No change in writing  Would like to think about treatment now as she thinks it may be getting worse  Pt has hx of SVT  Following with cards  No palp, chest pain  Pt has hx of igf and HLD  Due for labs      Pt has itchy lesion on back  The following portions of the patient's history were reviewed and updated as appropriate: allergies, current medications, past family history, past medical history, past social history, past surgical history and problem list     Review of Systems   Constitutional: Negative for chills, fatigue, fever and unexpected weight change  HENT: Negative for congestion, ear pain, hearing loss, postnasal drip, rhinorrhea, sinus pressure, sinus pain, sore throat, trouble swallowing and voice change  Eyes: Negative for pain, redness and visual disturbance  Respiratory: Negative for cough and shortness of breath  Cardiovascular: Negative for chest pain, palpitations and leg swelling  Gastrointestinal: Negative for abdominal pain, constipation, diarrhea and nausea  Endocrine: Negative for cold intolerance, heat intolerance, polydipsia and polyuria  Genitourinary: Negative for dysuria, frequency and urgency  Musculoskeletal: Negative for arthralgias, joint swelling and myalgias  Skin: Negative for rash  No suspicious lesions   Neurological: Positive for tremors  Negative for weakness, numbness and headaches  Hematological: Negative for adenopathy  Objective:      /78   Pulse 97   Temp 98 2 °F (36 8 °C)   Resp 16   Ht 5' (1 524 m)   Wt 56 kg (123 lb 6 4 oz)   SpO2 97%   BMI 24 10 kg/m²          Physical Exam  Constitutional:       General: She is not in acute distress  Appearance: She is well-developed  HENT:      Head: Normocephalic and atraumatic  Right Ear: Tympanic membrane, ear canal and external ear normal       Left Ear: Tympanic membrane, ear canal and external ear normal       Nose: Nose normal       Mouth/Throat:      Pharynx: No oropharyngeal exudate  Eyes:      Conjunctiva/sclera: Conjunctivae normal       Pupils: Pupils are equal, round, and reactive to light  Neck:      Thyroid: No thyromegaly  Vascular: No carotid bruit or JVD  Cardiovascular:      Rate and Rhythm: Regular rhythm  Heart sounds: S1 normal and S2 normal  No murmur heard  No friction rub  No gallop  No S3 or S4 sounds  Pulmonary:      Effort: Pulmonary effort is normal       Breath sounds: Normal breath sounds  No wheezing, rhonchi or rales  Chest:   Breasts:      Right: No axillary adenopathy  Left: No axillary adenopathy  Abdominal:      General: Bowel sounds are normal  There is no distension  Palpations: Abdomen is soft  Tenderness: There is no abdominal tenderness  Lymphadenopathy:      Cervical: No cervical adenopathy  Upper Body:      Right upper body: No axillary adenopathy  Left upper body: No axillary adenopathy  Skin:     Comments: Itchy skin lesion appears to be nml seborrheic keratosis (approx 6csa1km)  +excoriations   Neurological:      Mental Status: She is alert and oriented to person, place, and time  Cranial Nerves: No cranial nerve deficit  Sensory: No sensory deficit  Motor: Tremor present  No weakness, abnormal muscle tone or pronator drift  Coordination: Romberg sign negative  Coordination normal  Finger-Nose-Finger Test normal  Rapid alternating movements normal       Gait: Gait is intact  Deep Tendon Reflexes: Reflexes are normal and symmetric        Comments: Dystonic head tremor  No cogwheeling rigidity

## 2022-06-08 ENCOUNTER — TELEPHONE (OUTPATIENT)
Dept: NEUROLOGY | Facility: CLINIC | Age: 72
End: 2022-06-08

## 2022-06-20 DIAGNOSIS — N64.9 BREAST LESION: ICD-10-CM

## 2022-06-30 ENCOUNTER — TELEPHONE (OUTPATIENT)
Dept: FAMILY MEDICINE CLINIC | Facility: CLINIC | Age: 72
End: 2022-06-30

## 2022-06-30 DIAGNOSIS — N64.9 BREAST LESION: Primary | ICD-10-CM

## 2022-06-30 DIAGNOSIS — R92.8 OTHER ABNORMAL AND INCONCLUSIVE FINDINGS ON DIAGNOSTIC IMAGING OF BREAST: ICD-10-CM

## 2022-06-30 NOTE — TELEPHONE ENCOUNTER
Maria from [facility] called they need two scripts, one for a left breast cyst aspiration, and one for a left breast u/s guided biopsy, she is unsure what they will have to do yet, please fax to [phone]

## 2022-07-13 LAB
HBA1C MFR BLD HPLC: 5.9 %
HCV AB SER-ACNC: NEGATIVE

## 2022-10-03 ENCOUNTER — TELEPHONE (OUTPATIENT)
Dept: NEUROLOGY | Facility: CLINIC | Age: 72
End: 2022-10-03

## 2022-10-03 NOTE — TELEPHONE ENCOUNTER
Phoned pt to reschedule appt of 10/06//2022 with Dr Andres reza offer appt in [facility] location 10/07/2022 slot on hold

## 2022-12-02 ENCOUNTER — HOSPITAL ENCOUNTER (OUTPATIENT)
Dept: CT IMAGING | Facility: HOSPITAL | Age: 72
Discharge: HOME | End: 2022-12-02

## 2022-12-02 DIAGNOSIS — E78.5 HYPERLIPIDEMIA, UNSPECIFIED HYPERLIPIDEMIA TYPE: ICD-10-CM

## 2022-12-02 DIAGNOSIS — I10 ESSENTIAL HYPERTENSION, MALIGNANT: ICD-10-CM

## 2023-03-28 ENCOUNTER — TELEPHONE (OUTPATIENT)
Dept: FAMILY MEDICINE CLINIC | Facility: CLINIC | Age: 73
End: 2023-03-28

## 2023-03-28 NOTE — TELEPHONE ENCOUNTER
loose stools w/blood & vomiting on Saturday; loose stools on Sunday; ok on   Monday; loose stool with blood & clotting Tuesday AM Per appt Note       Patient was around her grandchildren that have the stomach bug  Patient states that her diarrhea is all blood stool coming out  Patient states that yesterday she was fine  Went to bed last night and had lower abd pain  Patient states that she sat down this morning and it was blood clots  Patient declined no more vomiting today  Declined fever today       Spoke with Dr Bell Heading patient  Needs to go the ED patient is agreeable and will be going to [facility]

## 2023-05-23 ENCOUNTER — RA CDI HCC (OUTPATIENT)
Dept: OTHER | Facility: HOSPITAL | Age: 73
End: 2023-05-23

## 2023-05-23 NOTE — PROGRESS NOTES
[facility]  coding opportunities          Chart Reviewed number of suggestions sent to Provider: 1     Patients Insurance     Medicare Insurance: [employer]

## 2023-05-27 ENCOUNTER — TELEPHONE (OUTPATIENT)
Dept: OTHER | Facility: OTHER | Age: 73
End: 2023-05-27

## 2023-05-27 NOTE — TELEPHONE ENCOUNTER
Patient called and canceled her appointment on 6/2 at 12:40 PM because she is unable to make the appointment due to she have to work, patient is asking if the office can give her back a call to reschedule her appointment, patient is asking if her appointment can be schedule for 6/15/,6/16 or 6/23 because she will be off of work those days

## 2023-07-07 ENCOUNTER — OFFICE VISIT (OUTPATIENT)
Dept: FAMILY MEDICINE CLINIC | Facility: CLINIC | Age: 73
End: 2023-07-07
Payer: MEDICARE

## 2023-07-07 VITALS
RESPIRATION RATE: 14 BRPM | SYSTOLIC BLOOD PRESSURE: 122 MMHG | BODY MASS INDEX: 25.52 KG/M2 | DIASTOLIC BLOOD PRESSURE: 80 MMHG | WEIGHT: 130 LBS | HEART RATE: 72 BPM | OXYGEN SATURATION: 99 % | TEMPERATURE: 98 F | HEIGHT: 60 IN

## 2023-07-07 DIAGNOSIS — I48.0 PAF (PAROXYSMAL ATRIAL FIBRILLATION) (HCC): ICD-10-CM

## 2023-07-07 DIAGNOSIS — G25.2 DYSTONIC TREMOR: ICD-10-CM

## 2023-07-07 DIAGNOSIS — M54.50 CHRONIC LOW BACK PAIN, UNSPECIFIED BACK PAIN LATERALITY, UNSPECIFIED WHETHER SCIATICA PRESENT: ICD-10-CM

## 2023-07-07 DIAGNOSIS — K52.9 COLITIS: ICD-10-CM

## 2023-07-07 DIAGNOSIS — E78.2 MIXED HYPERLIPIDEMIA: ICD-10-CM

## 2023-07-07 DIAGNOSIS — I10 BENIGN ESSENTIAL HYPERTENSION: ICD-10-CM

## 2023-07-07 DIAGNOSIS — G89.29 CHRONIC LOW BACK PAIN, UNSPECIFIED BACK PAIN LATERALITY, UNSPECIFIED WHETHER SCIATICA PRESENT: ICD-10-CM

## 2023-07-07 DIAGNOSIS — Z00.00 MEDICARE ANNUAL WELLNESS VISIT, SUBSEQUENT: ICD-10-CM

## 2023-07-07 DIAGNOSIS — E05.90 HYPERTHYROIDISM: ICD-10-CM

## 2023-07-07 DIAGNOSIS — M25.551 RIGHT HIP PAIN: ICD-10-CM

## 2023-07-07 DIAGNOSIS — Z17.0 MALIGNANT NEOPLASM OF UPPER-OUTER QUADRANT OF LEFT BREAST IN FEMALE, ESTROGEN RECEPTOR POSITIVE (HCC): Primary | ICD-10-CM

## 2023-07-07 DIAGNOSIS — R73.01 IFG (IMPAIRED FASTING GLUCOSE): ICD-10-CM

## 2023-07-07 DIAGNOSIS — M81.0 AGE-RELATED OSTEOPOROSIS WITHOUT CURRENT PATHOLOGICAL FRACTURE: ICD-10-CM

## 2023-07-07 DIAGNOSIS — C50.412 MALIGNANT NEOPLASM OF UPPER-OUTER QUADRANT OF LEFT BREAST IN FEMALE, ESTROGEN RECEPTOR POSITIVE (HCC): Primary | ICD-10-CM

## 2023-07-07 DIAGNOSIS — I47.1 SVT (SUPRAVENTRICULAR TACHYCARDIA) (HCC): ICD-10-CM

## 2023-07-07 PROCEDURE — G0439 PPPS, SUBSEQ VISIT: HCPCS | Performed by: FAMILY MEDICINE

## 2023-07-07 PROCEDURE — 99214 OFFICE O/P EST MOD 30 MIN: CPT | Performed by: FAMILY MEDICINE

## 2023-07-07 NOTE — PROGRESS NOTES
Assessment and Plan:     Problem List Items Addressed This Visit        Cardiovascular and Mediastinum    Benign essential hypertension  Well-controlled on current meds  Continue same  F/u 6 mo      PAF (paroxysmal atrial fibrillation) (HCC)  Remote hx of same  F/u with cardiology  No a/c at this time       Other    Hyperlipidemia  Check labs    Relevant Orders    Lipid panel    Malignant neoplasm of upper-outer quadrant of left female breast ([address]) - Primary  Pt refuses arimidex or faslodex  Understands that this increases risk of recurrence.     At this point, she needs mammogram/US  Make f/u appt with h/o at [facility] or I can refer her here  Doesn't want radiation/chemo      Relevant Orders    Comprehensive metabolic panel    CBC and differential    Mammo diagnostic bilateral w cad    US breast left limited (diagnostic)   Other Visit Diagnoses     Age-related osteoporosis without current pathological fracture      Discussed fosamax/prolia  Pt will consider but declines today      IFG (impaired fasting glucose)   Labs as ordered       Relevant Orders    Comprehensive metabolic panel    CBC and differential    Hemoglobin A1C    Hyperthyroidism      Hx of same  Labs as ordered    Relevant Orders    TSH, 3rd generation with Free T4 reflex    SVT (supraventricular tachycardia) ([address])      Hx of same  Continue f/u with cardiology      Dystonic tremor      Re-refer to neuro  Does not appear parkinsonian to me    Relevant Orders    Ambulatory Referral to Neurology    Colitis      Needs GI f/u   Refer to same      Relevant Orders    Ambulatory Referral to Gastroenterology    Right hip pain      Refer to PT    Relevant Orders    Ambulatory Referral to Physical Therapy    Chronic low back pain, unspecified back pain laterality, unspecified whether sciatica present      Refer to PT    Relevant Orders    Ambulatory Referral to Physical Therapy      Medicare annual wellness, subsequent   Pt declines imm's   Advised healthy diet and exercise as able   Mammogram/dexa are not preventive at this point   Up to date with crc screening   Aged out of cervical ca screening     Preventive health issues were discussed with patient, and age appropriate screening tests were ordered as noted in patient's After Visit Summary. Personalized health advice and appropriate referrals for health education or preventive services given if needed, as noted in patient's After Visit Summary. History of Present Illness:     Patient presents for a Medicare Wellness Visit    HPI     Pt presents for MAW and f/u    From preventive standpoint, pt due for labs, tdap, shingrix,covid, prevnar. She declines all immunizations. She tries to maintain activity and eat well. Up to date with crc screen. Aged out of cervical ca screening. Has had dexa. From a problem standpoint, pt has hx of breast CA sp recurrence in 2022. Declined faslodex at the time and wanted to keep taking arimidex (had recurrence on arimidex). She has now stopped arimidex because she felt it didn't work. She does not want to go on faslodex. She has not followed up with surgeon. She is soon due for mammogram.    Dexa shows osteoporosis. Declined prolia/reclast with h/o. Discussed fosamax. Pt declines today but will think about it. Pt was seen in ED at [facility] in march for lower GI bleed. CTA showed colitis. Was referred to GI but she hasn't gone as she states resident at ED told her she didn't have to. D/c instructions say to go. Pt has ongoing head tremor. Had appt with neuro, but they had to cancel, and then she couldn't come in due to work schedule. Needs to be referred again. Has seen dr Angelo Lim for hx of afib and HTN. Not on a/c. Recent coronary artery calcium score 0. Ongoing pain in lower back and R hip. Worse when she has been sitting and then stands. No radiation down leg. No paresthesias or weakness. Known DJD spine.     Patient Care Team:  Uri Hurley DO as PCP - General (Family Medicine)     Review of Systems:     Review of Systems   Constitutional: Negative for chills, fatigue, fever and unexpected weight change. HENT: Negative for congestion, ear pain, hearing loss, postnasal drip, rhinorrhea, sinus pressure, sinus pain, sore throat, trouble swallowing and voice change. Eyes: Negative for pain, redness and visual disturbance. Respiratory: Negative for cough and shortness of breath. Cardiovascular: Negative for chest pain, palpitations and leg swelling. Gastrointestinal: Negative for abdominal pain, constipation, diarrhea and nausea. Endocrine: Negative for cold intolerance, heat intolerance, polydipsia and polyuria. Genitourinary: Negative for dysuria, frequency and urgency. Musculoskeletal: Negative for arthralgias, joint swelling and myalgias. Skin: Negative for rash. No suspicious lesions   Neurological: Negative for weakness, numbness and headaches. Hematological: Negative for adenopathy.         Problem List:     Patient Active Problem List   Diagnosis   • Allergic rhinitis   • Benign essential hypertension   • Cervical dystonia   • Diverticulitis of colon   • Hyperlipidemia   • Malignant neoplasm of upper-outer quadrant of left female breast ([address])   • Osteopenia   • Toxic diffuse goiter   • PAF (paroxysmal atrial fibrillation) ([address])      Past Medical and Surgical History:     Past Medical History:   Diagnosis Date   • Allergic    • Allergic rhinitis    • Anisocoria    • Cancer ([address]) 2017    Left breast lumpectomy   • Cervicalgia    • Disease of thyroid gland    • Disorder of autonomic nervous system    • Disorder of the autonomic nervous system, unspecified    • Disorder of the autonomic nervous system, unspecified    • Diverticulitis of colon    • Dyspnea on exertion    • Essential hypertension, benign    • HL (hearing loss)    • Hyperlipidemia    • Hypertension    • Low back pain    • Malignant neoplasm of upper-outer quadrant of left female breast ([address] [facility])     Clinical stage from 2/22/2017: Stage IIA (T2, N0, M0) - Signed by Ovidio Darling MD on 3/16/2018   • Osteopenia    • Paroxysmal atrial fibrillation (HCC)    • Prediabetes    • Toxic diffuse goiter     Now several years off methimazole with stable thyroid labs. Counseled on s/s of hyperthyroidism. Repeat thyroid labs 1-2 times per year or as needed with symptoms.  Follow up as needed   • Unspecified hearing loss, bilateral    • Visual disturbances    • Visual impairment      Past Surgical History:   Procedure Laterality Date   • BREAST LUMPECTOMY Left 03/28/2017 03/28/2017LeVicki Trujillo     • CERVICAL CONE BIOPSY     • LYMPH NODE BIOPSY     • TONSILLECTOMY     • WISDOM TOOTH EXTRACTION        Family History:     Family History   Problem Relation Age of Onset   • Melanoma Daughter    • Coronary artery disease Father    • Diabetes Father    • Heart disease Father    • Breast cancer Mother    • Throat cancer Maternal Grandfather    • Coronary artery disease Paternal Grandfather    • No Known Problems Maternal Grandmother    • No Known Problems Daughter    • No Known Problems Daughter    • Asthma Brother       Social History:     Social History     Socioeconomic History   • Marital status: Single     Spouse name: None   • Number of children: 4   • Years of education: None   • Highest education level: None   Occupational History   • None   Tobacco Use   • Smoking status: Never   • Smokeless tobacco: Never   Vaping Use   • Vaping Use: Never used   Substance and Sexual Activity   • Alcohol use: Never   • Drug use: Never   • Sexual activity: Not Currently   Other Topics Concern   • None   Social History Narrative   • None     Social Determinants of Health     Financial Resource Strain: Low Risk  (7/7/2023)    Overall Financial Resource Strain (CARDIA)    • Difficulty of Paying Living Expenses: Not hard at all   Food Insecurity: Not on file   Transportation Needs: No Transportation Needs (7/7/2023)    PRAPARE - Transportation    • Lack of Transportation (Medical): No    • Lack of Transportation (Non-Medical): No   Physical Activity: Not on file   Stress: Not on file   Social Connections: Not on file   Intimate Partner Violence: Not on file   Housing Stability: Not on file      Medications and Allergies:     Current Outpatient Medications   Medication Sig Dispense Refill   • lisinopril (ZESTRIL) 10 mg tablet Take 5 mg by mouth daily     • triamcinolone (KENALOG) 0.1 % cream Apply topically 2 (two) times a day 30 g 0     No current facility-administered medications for this visit. Allergies   Allergen Reactions   • Metoprolol Anaphylaxis   • Other    • Albumen, Egg - Food Allergy GI Intolerance   • Ciprofloxacin      Leg pain, cramps   • Esmolol Other (See Comments)     chest tightness   • Ibuprofen Other (See Comments)     "Rapid heart rate"    • Metronidazole      A-fib   • Procaine Other (See Comments)     Rapid heart rate- "when used for tooth pulling"      Immunizations: There is no immunization history on file for this patient. Health Maintenance:         Topic Date Due   • Breast Cancer Screening: Mammogram  06/20/2023   • Colorectal Cancer Screening  03/26/2024   • Hepatitis C Screening  Completed         Topic Date Due   • COVID-19 Vaccine (1) Never done   • Pneumococcal Vaccine: 65+ Years (1 - PCV) Never done   • Influenza Vaccine (1) 09/01/2023      Medicare Screening Tests and Risk Assessments:     Miranda Martinez is here for her Subsequent Wellness visit. Health Risk Assessment:   Patient rates overall health as good. Patient feels that their physical health rating is same. Patient is satisfied with their life. Eyesight was rated as slightly worse. Hearing was rated as slightly worse. Patient feels that their emotional and mental health rating is same. Patients states they are never, rarely angry. Patient states they are sometimes unusually tired/fatigued.  Pain experienced in the last 7 days has been some. Patient's pain rating has been 2/10. Patient states that she has experienced no weight loss or gain in last 6 months. Depression Screening:   PHQ-2 Score: 0      Fall Risk Screening: In the past year, patient has experienced: no history of falling in past year      Urinary Incontinence Screening:   Patient has not leaked urine accidently in the last six months. Home Safety:  Patient does not have trouble with stairs inside or outside of their home. Patient has working smoke alarms and has working carbon monoxide detector. Home safety hazards include: none. Nutrition:   Current diet is Regular and Limited junk food. Medications:   Patient is not currently taking any over-the-counter supplements. Patient is able to manage medications. Activities of Daily Living (ADLs)/Instrumental Activities of Daily Living (IADLs):   Walk and transfer into and out of bed and chair?: Yes  Dress and groom yourself?: Yes    Bathe or shower yourself?: Yes    Feed yourself? Yes  Do your laundry/housekeeping?: Yes  Manage your money, pay your bills and track your expenses?: Yes  Make your own meals?: Yes    Do your own shopping?: Yes    Previous Hospitalizations:   Any hospitalizations or ED visits within the last 12 months?: Yes    How many hospitalizations have you had in the last year?: 1-2    Advance Care Planning:   Living will: Yes    Durable POA for healthcare:  Yes    Advanced directive: Yes      Cognitive Screening:   Provider or family/friend/caregiver concerned regarding cognition?: No    PREVENTIVE SCREENINGS      Cardiovascular Screening:    General: Screening Not Indicated and History Lipid Disorder      Diabetes Screening:     General: Screening Current      Colorectal Cancer Screening:     General: Screening Current      Breast Cancer Screening:     General: History Breast Cancer    Due for: Mammogram        Cervical Cancer Screening:    General: Screening Not Indicated Osteoporosis Screening:    General: Screening Not Indicated and History Osteoporosis      Abdominal Aortic Aneurysm (AAA) Screening:        General: Screening Not Indicated      Lung Cancer Screening:     General: Screening Not Indicated      Hepatitis C Screening:    General: Screening Current    Screening, Brief Intervention, and Referral to Treatment (SBIRT)    Screening  Typical number of drinks in a day: 0  Typical number of drinks in a week: 0  Interpretation: Low risk drinking behavior. AUDIT-C Screenin) How often did you have a drink containing alcohol in the past year? never  2) How many drinks did you have on a typical day when you were drinking in the past year? 0  3) How often did you have 6 or more drinks on one occasion in the past year? never    AUDIT-C Score: 0  Interpretation: Score 0-2 (female): Negative screen for alcohol misuse    Single Item Drug Screening:  How often have you used an illegal drug (including marijuana) or a prescription medication for non-medical reasons in the past year? never    Single Item Drug Screen Score: 0  Interpretation: Negative screen for possible drug use disorder    No results found. Physical Exam:     /80   Pulse 72   Temp 98 °F (36.7 °C)   Resp 14   Ht 5' (1.524 m)   Wt 59 kg (130 lb)   SpO2 99%   BMI 25.39 kg/m²     Physical Exam  Vitals and nursing note reviewed. Constitutional:       General: She is not in acute distress. Appearance: She is well-developed. HENT:      Head: Normocephalic and atraumatic. Right Ear: Tympanic membrane, ear canal and external ear normal.      Left Ear: Tympanic membrane, ear canal and external ear normal.      Nose: Nose normal. No congestion. Mouth/Throat:      Mouth: Mucous membranes are moist.      Pharynx: No oropharyngeal exudate or posterior oropharyngeal erythema. Eyes:      Extraocular Movements: Extraocular movements intact.       Conjunctiva/sclera: Conjunctivae normal.   Neck: Vascular: No carotid bruit. Cardiovascular:      Rate and Rhythm: Normal rate and regular rhythm. Heart sounds: No murmur heard. Pulmonary:      Effort: Pulmonary effort is normal. No respiratory distress. Breath sounds: Normal breath sounds. Abdominal:      Palpations: Abdomen is soft. Tenderness: There is no abdominal tenderness. Musculoskeletal:         General: No swelling. Cervical back: Neck supple. Lymphadenopathy:      Cervical: No cervical adenopathy. Skin:     General: Skin is warm and dry. Capillary Refill: Capillary refill takes less than 2 seconds. Neurological:      General: No focal deficit present. Mental Status: She is alert and oriented to person, place, and time. Comments: +head tremor. No cogwheeling rigidity. No pill-rolling tremor.    Psychiatric:         Mood and Affect: Mood normal.          Oren Lott,

## 2023-07-07 NOTE — PATIENT INSTRUCTIONS
Obtain mammogram and US  Consider fosamax or prolia for your osteoporosis and call me if you want to do anything  Refer to GI and PT  Obtain labs  Refer back to neuro      Medicare Preventive Visit Patient Instructions  Thank you for completing your Welcome to Medicare Visit or Medicare Annual Wellness Visit today. Your next wellness visit will be due in one year (7/7/2024). The screening/preventive services that you may require over the next 5-10 years are detailed below. Some tests may not apply to you based off risk factors and/or age. Screening tests ordered at today's visit but not completed yet may show as past due. Also, please note that scanned in results may not display below. Preventive Screenings:  Service Recommendations Previous Testing/Comments   Colorectal Cancer Screening  * Colonoscopy    * Fecal Occult Blood Test (FOBT)/Fecal Immunochemical Test (FIT)  * Fecal DNA/Cologuard Test  * Flexible Sigmoidoscopy Age: 43-73 years old   Colonoscopy: every 10 years (may be performed more frequently if at higher risk)  OR  FOBT/FIT: every 1 year  OR  Cologuard: every 3 years  OR  Sigmoidoscopy: every 5 years  Screening may be recommended earlier than age 39 if at higher risk for colorectal cancer. Also, an individualized decision between you and your healthcare provider will decide whether screening between the ages of 77-80 would be appropriate. Colonoscopy: 03/26/2021  FOBT/FIT: Not on file  Cologuard: 03/26/2021  Sigmoidoscopy: Not on file    Screening Current     Breast Cancer Screening Age: 36 years old  Frequency: every 1-2 years  Not required if history of left and right mastectomy Mammogram: 06/20/2022    History Breast Cancer   Cervical Cancer Screening Between the ages of 21-29, pap smear recommended once every 3 years. Between the ages of 32-69, can perform pap smear with HPV co-testing every 5 years.    Recommendations may differ for women with a history of total hysterectomy, cervical cancer, or abnormal pap smears in past. Pap Smear: Not on file    Screening Not Indicated   Hepatitis C Screening Once for adults born between 1945 and 1965  More frequently in patients at high risk for Hepatitis C Hep C Antibody: 07/13/2022    Screening Current   Diabetes Screening 1-2 times per year if you're at risk for diabetes or have pre-diabetes Fasting glucose: No results in last 5 years (No results in last 5 years)  A1C: 5.9 (7/13/2022)  Screening Current   Cholesterol Screening Once every 5 years if you don't have a lipid disorder. May order more often based on risk factors. Lipid panel: 08/02/2019    Screening Not Indicated  History Lipid Disorder     Other Preventive Screenings Covered by Medicare:  Abdominal Aortic Aneurysm (AAA) Screening: covered once if your at risk. You're considered to be at risk if you have a family history of AAA. Lung Cancer Screening: covers low dose CT scan once per year if you meet all of the following conditions: (1) Age 48-67; (2) No signs or symptoms of lung cancer; (3) Current smoker or have quit smoking within the last 15 years; (4) You have a tobacco smoking history of at least 20 pack years (packs per day multiplied by number of years you smoked); (5) You get a written order from a healthcare provider. Glaucoma Screening: covered annually if you're considered high risk: (1) You have diabetes OR (2) Family history of glaucoma OR (3)  aged 48 and older OR (3)  American aged 72 and older  Osteoporosis Screening: covered every 2 years if you meet one of the following conditions: (1) You're estrogen deficient and at risk for osteoporosis based off medical history and other findings; (2) Have a vertebral abnormality; (3) On glucocorticoid therapy for more than 3 months; (4) Have primary hyperparathyroidism; (5) On osteoporosis medications and need to assess response to drug therapy. Last bone density test (DXA Scan): Not on file.   HIV Screening: covered annually if you're between the age of 14-79. Also covered annually if you are younger than 13 and older than 72 with risk factors for HIV infection. For pregnant patients, it is covered up to 3 times per pregnancy. Immunizations:  Immunization Recommendations   Influenza Vaccine Annual influenza vaccination during flu season is recommended for all persons aged >= 6 months who do not have contraindications   Pneumococcal Vaccine   * Pneumococcal conjugate vaccine = PCV13 (Prevnar 13), PCV15 (Vaxneuvance), PCV20 (Prevnar 20)  * Pneumococcal polysaccharide vaccine = PPSV23 (Pneumovax) Adults 20-63 years old: 1-3 doses may be recommended based on certain risk factors  Adults 72 years old: 1-2 doses may be recommended based off what pneumonia vaccine you previously received   Hepatitis B Vaccine 3 dose series if at intermediate or high risk (ex: diabetes, end stage renal disease, liver disease)   Tetanus (Td) Vaccine - COST NOT COVERED BY MEDICARE PART B Following completion of primary series, a booster dose should be given every 10 years to maintain immunity against tetanus. Td may also be given as tetanus wound prophylaxis. Tdap Vaccine - COST NOT COVERED BY MEDICARE PART B Recommended at least once for all adults. For pregnant patients, recommended with each pregnancy. Shingles Vaccine (Shingrix) - COST NOT COVERED BY MEDICARE PART B  2 shot series recommended in those aged 48 and above     Health Maintenance Due:      Topic Date Due    Breast Cancer Screening: Mammogram  06/20/2023    Colorectal Cancer Screening  03/26/2024    Hepatitis C Screening  Completed     Immunizations Due:      Topic Date Due    COVID-19 Vaccine (1) Never done    Pneumococcal Vaccine: 65+ Years (1 - PCV) Never done    Influenza Vaccine (1) 09/01/2023     Advance Directives   What are advance directives? Advance directives are legal documents that state your wishes and plans for medical care.  These plans are made ahead of time in case you lose your ability to make decisions for yourself. Advance directives can apply to any medical decision, such as the treatments you want, and if you want to donate organs. What are the types of advance directives? There are many types of advance directives, and each state has rules about how to use them. You may choose a combination of any of the following:  Living will: This is a written record of the treatment you want. You can also choose which treatments you do not want, which to limit, and which to stop at a certain time. This includes surgery, medicine, IV fluid, and tube feedings. Durable power of  for [facility]): This is a written record that states who you want to make healthcare choices for you when you are unable to make them for yourself. This person, called a proxy, is usually a family member or a friend. You may choose more than 1 proxy. Do not resuscitate (DNR) order:  A DNR order is used in case your heart stops beating or you stop breathing. It is a request not to have certain forms of treatment, such as CPR. A DNR order may be included in other types of advance directives. Medical directive: This covers the care that you want if you are in a coma, near death, or unable to make decisions for yourself. You can list the treatments you want for each condition. Treatment may include pain medicine, surgery, blood transfusions, dialysis, IV or tube feedings, and a ventilator (breathing machine). Values history: This document has questions about your views, beliefs, and how you feel and think about life. This information can help others choose the care that you would choose. Why are advance directives important? An advance directive helps you control your care. Although spoken wishes may be used, it is better to have your wishes written down. Spoken wishes can be misunderstood, or not followed. Treatments may be given even if you do not want them.  An advance directive may make it easier for your family to make difficult choices about your care. © Copyright [employer] 2018 Information is for End User's use only and may not be sold, redistributed or otherwise used for commercial purposes.  All illustrations and images included in CareNotes® are the copyrighted property of [employer].D.A[employer]M., Inc[employer] or [address] [place] [address]

## 2023-07-10 ENCOUNTER — TELEPHONE (OUTPATIENT)
Dept: ADMINISTRATIVE | Facility: OTHER | Age: 73
End: 2023-07-10

## 2023-07-10 NOTE — TELEPHONE ENCOUNTER
Upon review of the In Basket request we were able to locate, review, and update the patient chart as requested for DEXA Scan and Mammogram.    Any additional questions or concerns should be emailed to the Practice Liaisons via the appropriate education email address, please do not reply via In Basket.     Thank you  Yazmin Turner

## 2023-07-10 NOTE — TELEPHONE ENCOUNTER
----- Message from Gaudencio Canseco sent at 7/7/2023  3:05 PM EDT -----  Regarding: care gap request  07/07/23 3:05 PM    Hello, our patient attached above has had DEXA Scan and Mammogram completed/performed. Please assist in updating the patient chart by pulling the Care Everywhere (CE) document. The date of service is 7/2022.      Thank you,  Emily Queen  [facility]

## 2023-07-13 ENCOUNTER — TELEPHONE (OUTPATIENT)
Dept: NEUROLOGY | Facility: CLINIC | Age: 73
End: 2023-07-13

## 2023-07-13 NOTE — TELEPHONE ENCOUNTER
Patient calling to schedule new patient appointment for tremors. No testing done. Triage intake sent.

## 2023-07-18 ENCOUNTER — APPOINTMENT (OUTPATIENT)
Dept: LAB | Facility: MEDICAL CENTER | Age: 73
End: 2023-07-18
Payer: MEDICARE

## 2023-07-18 DIAGNOSIS — Z17.0 MALIGNANT NEOPLASM OF UPPER-OUTER QUADRANT OF LEFT BREAST IN FEMALE, ESTROGEN RECEPTOR POSITIVE (HCC): ICD-10-CM

## 2023-07-18 DIAGNOSIS — E78.2 MIXED HYPERLIPIDEMIA: ICD-10-CM

## 2023-07-18 DIAGNOSIS — E05.90 HYPERTHYROIDISM: ICD-10-CM

## 2023-07-18 DIAGNOSIS — R73.01 IFG (IMPAIRED FASTING GLUCOSE): ICD-10-CM

## 2023-07-18 DIAGNOSIS — C50.412 MALIGNANT NEOPLASM OF UPPER-OUTER QUADRANT OF LEFT BREAST IN FEMALE, ESTROGEN RECEPTOR POSITIVE (HCC): ICD-10-CM

## 2023-07-18 LAB
ALBUMIN SERPL BCP-MCNC: 3.8 G/DL (ref 3.5–5)
ALP SERPL-CCNC: 89 U/L (ref 46–116)
ALT SERPL W P-5'-P-CCNC: 26 U/L (ref 12–78)
ANION GAP SERPL CALCULATED.3IONS-SCNC: 2 MMOL/L
AST SERPL W P-5'-P-CCNC: 16 U/L (ref 5–45)
BASOPHILS # BLD AUTO: 0.02 THOUSANDS/ÂΜL (ref 0–0.1)
BASOPHILS NFR BLD AUTO: 0 % (ref 0–1)
BILIRUB SERPL-MCNC: 1.47 MG/DL (ref 0.2–1)
BUN SERPL-MCNC: 16 MG/DL (ref 5–25)
CALCIUM SERPL-MCNC: 9.4 MG/DL (ref 8.3–10.1)
CHLORIDE SERPL-SCNC: 111 MMOL/L (ref 96–108)
CHOLEST SERPL-MCNC: 234 MG/DL
CO2 SERPL-SCNC: 28 MMOL/L (ref 21–32)
CREAT SERPL-MCNC: 0.79 MG/DL (ref 0.6–1.3)
EOSINOPHIL # BLD AUTO: 0.05 THOUSAND/ÂΜL (ref 0–0.61)
EOSINOPHIL NFR BLD AUTO: 1 % (ref 0–6)
ERYTHROCYTE [DISTWIDTH] IN BLOOD BY AUTOMATED COUNT: 12.6 % (ref 11.6–15.1)
EST. AVERAGE GLUCOSE BLD GHB EST-MCNC: 117 MG/DL
GFR SERPL CREATININE-BSD FRML MDRD: 75 ML/MIN/1.73SQ M
GLUCOSE P FAST SERPL-MCNC: 93 MG/DL (ref 65–99)
HBA1C MFR BLD: 5.7 %
HCT VFR BLD AUTO: 45.2 % (ref 34.8–46.1)
HDLC SERPL-MCNC: 77 MG/DL
HGB BLD-MCNC: 14.5 G/DL (ref 11.5–15.4)
IMM GRANULOCYTES # BLD AUTO: 0.01 THOUSAND/UL (ref 0–0.2)
IMM GRANULOCYTES NFR BLD AUTO: 0 % (ref 0–2)
LDLC SERPL CALC-MCNC: 145 MG/DL (ref 0–100)
LYMPHOCYTES # BLD AUTO: 1.81 THOUSANDS/ÂΜL (ref 0.6–4.47)
LYMPHOCYTES NFR BLD AUTO: 35 % (ref 14–44)
MCH RBC QN AUTO: 28.4 PG (ref 26.8–34.3)
MCHC RBC AUTO-ENTMCNC: 32.1 G/DL (ref 31.4–37.4)
MCV RBC AUTO: 89 FL (ref 82–98)
MONOCYTES # BLD AUTO: 0.49 THOUSAND/ÂΜL (ref 0.17–1.22)
MONOCYTES NFR BLD AUTO: 10 % (ref 4–12)
NEUTROPHILS # BLD AUTO: 2.75 THOUSANDS/ÂΜL (ref 1.85–7.62)
NEUTS SEG NFR BLD AUTO: 54 % (ref 43–75)
NONHDLC SERPL-MCNC: 157 MG/DL
NRBC BLD AUTO-RTO: 0 /100 WBCS
PLATELET # BLD AUTO: 200 THOUSANDS/UL (ref 149–390)
PMV BLD AUTO: 11.1 FL (ref 8.9–12.7)
POTASSIUM SERPL-SCNC: 4.6 MMOL/L (ref 3.5–5.3)
PROT SERPL-MCNC: 6.8 G/DL (ref 6.4–8.4)
RBC # BLD AUTO: 5.1 MILLION/UL (ref 3.81–5.12)
SODIUM SERPL-SCNC: 141 MMOL/L (ref 135–147)
TRIGL SERPL-MCNC: 62 MG/DL
TSH SERPL DL<=0.05 MIU/L-ACNC: 1.01 UIU/ML (ref 0.45–4.5)
WBC # BLD AUTO: 5.13 THOUSAND/UL (ref 4.31–10.16)

## 2023-07-18 PROCEDURE — 84443 ASSAY THYROID STIM HORMONE: CPT

## 2023-07-18 PROCEDURE — 80053 COMPREHEN METABOLIC PANEL: CPT

## 2023-07-18 PROCEDURE — 83036 HEMOGLOBIN GLYCOSYLATED A1C: CPT

## 2023-07-18 PROCEDURE — 85025 COMPLETE CBC W/AUTO DIFF WBC: CPT

## 2023-07-18 PROCEDURE — 80061 LIPID PANEL: CPT

## 2023-07-18 PROCEDURE — 36415 COLL VENOUS BLD VENIPUNCTURE: CPT

## 2023-08-25 DIAGNOSIS — C50.412 MALIGNANT NEOPLASM OF UPPER-OUTER QUADRANT OF LEFT BREAST IN FEMALE, ESTROGEN RECEPTOR POSITIVE: ICD-10-CM

## 2023-08-25 DIAGNOSIS — Z17.0 MALIGNANT NEOPLASM OF UPPER-OUTER QUADRANT OF LEFT BREAST IN FEMALE, ESTROGEN RECEPTOR POSITIVE: ICD-10-CM

## 2023-09-01 ENCOUNTER — OFFICE VISIT (OUTPATIENT)
Dept: GASTROENTEROLOGY | Facility: MEDICAL CENTER | Age: 73
End: 2023-09-01
Payer: MEDICARE

## 2023-09-01 VITALS
WEIGHT: 130.2 LBS | BODY MASS INDEX: 25.43 KG/M2 | DIASTOLIC BLOOD PRESSURE: 78 MMHG | HEART RATE: 71 BPM | SYSTOLIC BLOOD PRESSURE: 128 MMHG | TEMPERATURE: 97.8 F

## 2023-09-01 DIAGNOSIS — K55.9 ISCHEMIC COLITIS (HCC): ICD-10-CM

## 2023-09-01 DIAGNOSIS — K59.00 CONSTIPATION, UNSPECIFIED CONSTIPATION TYPE: Primary | ICD-10-CM

## 2023-09-01 DIAGNOSIS — Z12.11 SCREENING FOR MALIGNANT NEOPLASM OF COLON: ICD-10-CM

## 2023-09-01 PROCEDURE — 99204 OFFICE O/P NEW MOD 45 MIN: CPT | Performed by: STUDENT IN AN ORGANIZED HEALTH CARE EDUCATION/TRAINING PROGRAM

## 2023-09-02 NOTE — PROGRESS NOTES
[facility] - Outpatient Consultation  Deedee March 68 y.o. female MRN: [id number]  Encounter: [id number]      Assessment and Plan:    1. Constipation, unspecified constipation type    2. Ischemic colitis ([address])    3. Screening for malignant neoplasm of colon        68 y.o. female w/ hx of Afib not on AC, HTN, HLD, breast cancer 2017 on anastrazole s/p recurrence with lumpectomy 2022 (declining all further treatment including hormonal therapy and XRT) who is referred to GI for colitis in 3/2023. Patient's self-limited episode of abdominal pain, diarrhea, and hematochezia with left-sided colitis in 6/0179 is almost certainly from ischemic colitis, especially given the patient's age, sex, comorbidities, and chronic constipation. Given complete resolution of symptoms, this does not require further evaluation. Given that her last Cologuard was in 2021, she is technically up to date on CRC screening. I did offer colonoscopy, especially since the patient did have hematochezia with her ischemic colitis in 3/2023. However, patient is very reluctant, as she is worried that the bowel prep will cause her to have palpitations. The need for further CRC screening is also debatable given that she is declining further treatment for recurrent breast cancer. She will think about it but will defer for now. - Fiber supplementation  - Defer screening colonoscopy for now    No orders of the defined types were placed in this encounter.    ______________________________________________________________________    History of Present Illness:    Deedee March is a 68 y.o. female w/ hx of Afib not on AC, HTN, HLD, breast cancer 2017 on anastrazole s/p recurrence with lumpectomy 2022 (declining all further treatment including hormonal therapy and XRT) who is referred to GI for colitis in 3/2023. Patient reports being exposed to her granddaughter who had a diarrheal illness in 3/2023.  Five days later, she had diarrhea and severe LLQ pain followed by hematochezia. She went to the ER where CT showed mild left-sided colitis. This episode has completely resolved. She is back to her baseline of constipation with frequent straining during BMs. Last Cologuard 3/2021 was negative. Last colonoscopy was in the 1990s and reportedly normal. No family history of colon cancer. Review of Systems:  As per HPI. Otherwise negative. Historical Information   Past Medical History:   Diagnosis Date   • Allergic    • Allergic rhinitis    • Anisocoria    • Cancer ([address]) 2017    Left breast lumpectomy   • Cervicalgia    • Disease of thyroid gland    • Disorder of autonomic nervous system    • Disorder of the autonomic nervous system, unspecified    • Disorder of the autonomic nervous system, unspecified    • Diverticulitis of colon    • Dyspnea on exertion    • Essential hypertension, benign    • HL (hearing loss)    • Hyperlipidemia    • Hypertension    • Low back pain    • Malignant neoplasm of upper-outer quadrant of left female breast ([address])     Clinical stage from 2/22/2017: Stage IIA (T2, N0, M0) - Signed by Nasrin Nichole MD on 3/16/2018   • Osteopenia    • Paroxysmal atrial fibrillation (HCC)    • Prediabetes    • Toxic diffuse goiter     Now several years off methimazole with stable thyroid labs. Counseled on s/s of hyperthyroidism. Repeat thyroid labs 1-2 times per year or as needed with symptoms.  Follow up as needed   • Unspecified hearing loss, bilateral    • Visual disturbances    • Visual impairment      Past Surgical History:   Procedure Laterality Date   • BREAST LUMPECTOMY Left 03/28/2017 03/28/2017LeVicki Trujillo     • CERVICAL CONE BIOPSY     • LYMPH NODE BIOPSY     • TONSILLECTOMY     • WISDOM TOOTH EXTRACTION       Social History   Social History     Substance and Sexual Activity   Alcohol Use Never     Social History     Substance and Sexual Activity   Drug Use Never     Social History Tobacco Use   Smoking Status Never   Smokeless Tobacco Never     Family History   Problem Relation Age of Onset   • Melanoma Daughter    • Coronary artery disease Father    • Diabetes Father    • Heart disease Father    • Breast cancer Mother    • Throat cancer Maternal Grandfather    • Coronary artery disease Paternal Grandfather    • No Known Problems Maternal Grandmother    • No Known Problems Daughter    • No Known Problems Daughter    • Asthma Brother        Meds/Allergies       Current Outpatient Medications:   •  lisinopril (ZESTRIL) 10 mg tablet  •  triamcinolone (KENALOG) 0.1 % cream    Allergies   Allergen Reactions   • Metoprolol Anaphylaxis   • Other    • Albumen, Egg - Food Allergy GI Intolerance   • Ciprofloxacin      Leg pain, cramps   • Esmolol Other (See Comments)     chest tightness   • Ibuprofen Other (See Comments)     "Rapid heart rate"    • Metronidazole      A-fib   • Procaine Other (See Comments)     Rapid heart rate- "when used for tooth pulling"           Objective     Blood pressure 128/78, pulse 71, temperature 97.8 °F (36.6 °C), weight 59.1 kg (130 lb 3.2 oz). Body mass index is 25.43 kg/m².         Physical Exam:      General: No acute distress  Abdomen: Soft, non-tender, non-distended, normoactive bowel sounds  Neuro: Awake, alert, oriented x 3    Lab Results:   Lab Results   Component Value Date/Time    WBC 5.13 07/18/2023 10:47 AM    HGB 14.5 07/18/2023 10:47 AM     07/18/2023 10:47 AM    SODIUM 141 07/18/2023 10:47 AM    K 4.6 07/18/2023 10:47 AM     (H) 07/18/2023 10:47 AM    CO2 28 07/18/2023 10:47 AM    BUN 16 07/18/2023 10:47 AM    CREATININE 0.79 07/18/2023 10:47 AM    AST 16 07/18/2023 10:47 AM    ALT 26 07/18/2023 10:47 AM    ALKPHOS 89 07/18/2023 10:47 AM    TBILI 1.47 (H) 07/18/2023 10:47 AM    ALB 3.8 07/18/2023 10:47 AM

## 2024-01-04 ENCOUNTER — TELEPHONE (OUTPATIENT)
Dept: GASTROENTEROLOGY | Facility: MEDICAL CENTER | Age: 74
End: 2024-01-04

## 2024-05-01 ENCOUNTER — CONSULT (OUTPATIENT)
Dept: NEUROLOGY | Facility: CLINIC | Age: 74
End: 2024-05-01
Payer: MEDICARE

## 2024-05-01 VITALS
SYSTOLIC BLOOD PRESSURE: 136 MMHG | DIASTOLIC BLOOD PRESSURE: 70 MMHG | HEART RATE: 60 BPM | WEIGHT: 122 LBS | BODY MASS INDEX: 23.83 KG/M2

## 2024-05-01 DIAGNOSIS — G25.2 DYSTONIC TREMOR: ICD-10-CM

## 2024-05-01 DIAGNOSIS — G24.3 CERVICAL DYSTONIA: Primary | ICD-10-CM

## 2024-05-01 PROCEDURE — 99204 OFFICE O/P NEW MOD 45 MIN: CPT | Performed by: PSYCHIATRY & NEUROLOGY

## 2024-05-01 RX ORDER — BACLOFEN 10 MG/1
TABLET ORAL
Qty: 60 TABLET | Refills: 5 | Status: SHIPPED | OUTPATIENT
Start: 2024-05-01

## 2024-05-01 NOTE — PATIENT INSTRUCTIONS
Cervical dystonia    We reviewed treatment options including medications (baclofen, trihexyphenidyl) and neurotoxin injections to muscles of the neck. We discussed the potential benefits and risks of neurotoxin injections . Injections are to treat symptoms.  Potential side effects such as excessive weakness, spread of toxin causing dysphagia, infection and bleeding discussed. Injections

## 2024-05-01 NOTE — ASSESSMENT & PLAN NOTE
History slowly progressive head tremor over the past 5 years.  Tremor associated with sensation of slight pulling to the left.  Tremor worse when under stress.  On exam she is noted to have slight left torticollis with mild right lateral Tang.  Almost constant diagonal tremor.  Although no sensory tricks were noted by the patient.  When touching the back of her head tremor did not appear to dampen.  She has a history of cervical degenerative disease.    We reviewed treatment options including medications (baclofen, trihexyphenidyl) and neurotoxin injections to muscles of the neck. We discussed the potential benefits and risks of neurotoxin injections . Injections are to treat symptoms.  Potential side effects such as excessive weakness, spread of toxin causing dysphagia, infection and bleeding discussed.    At this time she is not interested in neurotoxin injections.  She did express interest in trying medication.  Will start baclofen 5 mg twice daily every morning and late afternoon.  After week she will increase it to 10 mg twice daily.  If ineffective she will contact the office via [facility] [facility] and we can provide instructions for further increase.  If developing side effects we will taper off and consider a trial of trihexyphenidyl.

## 2024-05-30 ENCOUNTER — OFFICE VISIT (OUTPATIENT)
Dept: GASTROENTEROLOGY | Facility: MEDICAL CENTER | Age: 74
End: 2024-05-30
Payer: MEDICARE

## 2024-05-30 VITALS
SYSTOLIC BLOOD PRESSURE: 146 MMHG | BODY MASS INDEX: 24.88 KG/M2 | HEART RATE: 65 BPM | TEMPERATURE: 97.8 F | WEIGHT: 127.4 LBS | DIASTOLIC BLOOD PRESSURE: 83 MMHG

## 2024-05-30 DIAGNOSIS — K59.00 CONSTIPATION, UNSPECIFIED CONSTIPATION TYPE: Primary | ICD-10-CM

## 2024-05-30 DIAGNOSIS — K55.9 ISCHEMIC COLITIS (HCC): ICD-10-CM

## 2024-05-30 DIAGNOSIS — Z12.11 SCREENING FOR MALIGNANT NEOPLASM OF COLON: ICD-10-CM

## 2024-05-30 PROCEDURE — 99214 OFFICE O/P EST MOD 30 MIN: CPT | Performed by: STUDENT IN AN ORGANIZED HEALTH CARE EDUCATION/TRAINING PROGRAM

## 2024-05-30 NOTE — PATIENT INSTRUCTIONS
- Start taking Miralax if needed. Miralax is not absorbed by the intestines, so it is very safe even with long-term use. Please mix one capful (17 grams) with an 8 ounce glass of water and drink every morning.

## 2024-05-30 NOTE — PROGRESS NOTES
[facility] - Outpatient Consultation  Fernanda Winston 74 y.o. female MRN: [id number]  Encounter: [id number]      Assessment and Plan:    1. Constipation, unspecified constipation type    2. Ischemic colitis (HCC)    3. Screening for malignant neoplasm of colon          74 y.o. female w/ hx of Afib not on AC, HTN, HLD, breast cancer 2017 s/p lumpectomy x 2 (most recently 2022) with concern for recurrence but declining all further treatment who presents for follow-up of constipation and ischemic colitis 3/2023.    Patient's self-limited episode of abdominal pain, diarrhea, and hematochezia with left-sided colitis in 3/2023 is almost certainly from ischemic colitis, especially given the patient's age, sex, comorbidities, and chronic constipation. Given complete resolution of symptoms, this does not require further evaluation.    Given that her last Cologuard was in 2021, she is technically due for CRC screening this year. I previously offered colonoscopy to follow-up hematochezia seen during her episode of ischemic colitis, but patient declined due to fear of palpitations from bowel prep.     However, at this point, I do not think patient will see any benefit from further CRC screening. She is already very reluctant to undergo colonoscopy. Furthermore, there is concern for recurrent breast cancer (refer to [facility] oncology note 3/2024), but she is declining all further treatment for breast cancer (even having stopped the Arimidex she was taking when the recurrence occurred).    - Consider Miralax if constipation does not improve with dietary changes and probiotics  - No further CRC screening recommended    PRN follow-up    No orders of the defined types were placed in this encounter.    ______________________________________________________________________    History of Present Illness:    Fernanda Winston is a 74 y.o. female w/ hx of Afib not on AC, HTN, HLD, breast cancer 2017 s/p  lumpectomy x 2 (most recently 2022) with concern for recurrence but declining all further treatment who presents for follow-up of constipation and ischemic colitis 3/2023.    Patient reports being exposed to her granddaughter who had a diarrheal illness in 3/2023. Five days later, she had diarrhea and severe LLQ pain followed by hematochezia. She went to the ER where CT showed mild left-sided colitis. This episode has completely resolved and most likely represents ischemic colitis.    She is back to her baseline of constipation. She tried fiber supplementation which did not help. Dietary changes were helpful although constipation seems to have recurred recently since she is on a diet to lose weight.    Last Cologuard 3/2021 was negative. Last colonoscopy was in the 1990s and reportedly normal. No family history of colon cancer.      Review of Systems:  As per HPI. Otherwise negative.      Historical Information   Past Medical History:   Diagnosis Date    Allergic     Allergic rhinitis     Anisocoria     Cancer (HCC) 2017    Left breast lumpectomy    Cervicalgia     Disease of thyroid gland     Disorder of autonomic nervous system     Disorder of the autonomic nervous system, unspecified     Disorder of the autonomic nervous system, unspecified     Diverticulitis of colon     Dyspnea on exertion     Essential hypertension, benign     HL (hearing loss)     Hyperlipidemia     Hypertension     Low back pain     Malignant neoplasm of upper-outer quadrant of left female breast (HCC)     Clinical stage from 2/22/2017: Stage IIA (T2, N0, M0) - Signed by Leslie Trujillo MD on 3/16/2018    Osteopenia     Paroxysmal atrial fibrillation (HCC)     Prediabetes     Toxic diffuse goiter     Now several years off methimazole with stable thyroid labs. Counseled on s/s of hyperthyroidism. Repeat thyroid labs 1-2 times per year or as needed with symptoms. Follow up as needed    Unspecified hearing loss, bilateral     Visual disturbances   "   Visual impairment      Past Surgical History:   Procedure Laterality Date    BREAST LUMPECTOMY Left 03/28/2017 03/28/2017LeVicki Leslie Trujillo      CERVICAL CONE BIOPSY      LYMPH NODE BIOPSY      TONSILLECTOMY      WISDOM TOOTH EXTRACTION       Social History   Social History     Substance and Sexual Activity   Alcohol Use Never     Social History     Substance and Sexual Activity   Drug Use Never     Social History     Tobacco Use   Smoking Status Never   Smokeless Tobacco Never     Family History   Problem Relation Age of Onset    Melanoma Daughter     Coronary artery disease Father     Diabetes Father     Heart disease Father     Breast cancer Mother     Throat cancer Maternal Grandfather     Coronary artery disease Paternal Grandfather     No Known Problems Maternal Grandmother     No Known Problems Daughter     No Known Problems Daughter     Asthma Brother        Meds/Allergies       Current Outpatient Medications:     baclofen 10 mg tablet    lisinopril (ZESTRIL) 10 mg tablet    triamcinolone (KENALOG) 0.1 % cream    Allergies   Allergen Reactions    Metoprolol Anaphylaxis    Other     Albumen, Egg - Food Allergy GI Intolerance    Ciprofloxacin      Leg pain, cramps    Esmolol Other (See Comments)     chest tightness    Ibuprofen Other (See Comments)     \"Rapid heart rate\"     Metronidazole      A-fib    Procaine Other (See Comments)     Rapid heart rate- \"when used for tooth pulling\"           Objective     Blood pressure 146/83, pulse 65, temperature 97.8 °F (36.6 °C), weight 57.8 kg (127 lb 6.4 oz). Body mass index is 24.88 kg/m².        Physical Exam:      General: No acute distress  Abdomen: Soft, non-tender, non-distended, normoactive bowel sounds  Neuro: Awake, alert, oriented x 3    Lab Results:   Lab Results   Component Value Date/Time    WBC 5.13 07/18/2023 10:47 AM    HGB 14.5 07/18/2023 10:47 AM     07/18/2023 10:47 AM    SODIUM 141 07/18/2023 10:47 AM    SODIUM 141 03/28/2023 02:07 PM "    K 4.6 07/18/2023 10:47 AM    K 3.6 03/28/2023 02:07 PM     (H) 07/18/2023 10:47 AM     03/28/2023 02:07 PM    CO2 28 07/18/2023 10:47 AM    CO2 26 03/28/2023 02:07 PM    BUN 16 07/18/2023 10:47 AM    BUN 27 (H) 03/28/2023 02:07 PM    CREATININE 0.79 07/18/2023 10:47 AM    CREATININE 0.71 03/28/2023 02:07 PM    AST 16 07/18/2023 10:47 AM    AST 22 03/28/2023 02:07 PM    ALT 26 07/18/2023 10:47 AM    ALT 21 03/28/2023 02:07 PM    ALKPHOS 89 07/18/2023 10:47 AM    ALKPHOS 73 03/28/2023 02:07 PM    TBILI 1.47 (H) 07/18/2023 10:47 AM    TBILI 1.8 (H) 03/28/2023 02:07 PM    ALB 3.8 07/18/2023 10:47 AM    ALB 4.1 03/28/2023 02:07 PM    INR 1.1 03/28/2023 03:23 PM

## 2024-07-08 ENCOUNTER — RA CDI HCC (OUTPATIENT)
Dept: OTHER | Facility: HOSPITAL | Age: 74
End: 2024-07-08

## 2024-07-08 NOTE — PROGRESS NOTES
I48.91  HCC coding opportunities          Chart Reviewed number of suggestions sent to Provider: 1     Patients Insurance     Medicare Insurance: Medicare

## 2024-07-16 ENCOUNTER — OFFICE VISIT (OUTPATIENT)
Dept: FAMILY MEDICINE CLINIC | Facility: CLINIC | Age: 74
End: 2024-07-16
Payer: MEDICARE

## 2024-07-16 VITALS
DIASTOLIC BLOOD PRESSURE: 84 MMHG | RESPIRATION RATE: 16 BRPM | HEART RATE: 67 BPM | BODY MASS INDEX: 23.95 KG/M2 | HEIGHT: 60 IN | OXYGEN SATURATION: 98 % | TEMPERATURE: 98.2 F | WEIGHT: 122 LBS | SYSTOLIC BLOOD PRESSURE: 126 MMHG

## 2024-07-16 DIAGNOSIS — C50.412 MALIGNANT NEOPLASM OF UPPER-OUTER QUADRANT OF LEFT BREAST IN FEMALE, ESTROGEN RECEPTOR POSITIVE (HCC): ICD-10-CM

## 2024-07-16 DIAGNOSIS — Z00.00 MEDICARE ANNUAL WELLNESS VISIT, SUBSEQUENT: Primary | ICD-10-CM

## 2024-07-16 DIAGNOSIS — Z17.0 MALIGNANT NEOPLASM OF UPPER-OUTER QUADRANT OF LEFT BREAST IN FEMALE, ESTROGEN RECEPTOR POSITIVE (HCC): ICD-10-CM

## 2024-07-16 DIAGNOSIS — I48.0 PAF (PAROXYSMAL ATRIAL FIBRILLATION) (HCC): ICD-10-CM

## 2024-07-16 DIAGNOSIS — I47.10 SVT (SUPRAVENTRICULAR TACHYCARDIA): ICD-10-CM

## 2024-07-16 DIAGNOSIS — R73.01 IFG (IMPAIRED FASTING GLUCOSE): ICD-10-CM

## 2024-07-16 DIAGNOSIS — Z85.3 HISTORY OF BREAST CANCER: ICD-10-CM

## 2024-07-16 DIAGNOSIS — Z13.220 SCREENING FOR LIPOID DISORDERS: ICD-10-CM

## 2024-07-16 DIAGNOSIS — Z12.11 SCREENING FOR COLORECTAL CANCER: ICD-10-CM

## 2024-07-16 DIAGNOSIS — Z12.12 SCREENING FOR COLORECTAL CANCER: ICD-10-CM

## 2024-07-16 PROCEDURE — G0439 PPPS, SUBSEQ VISIT: HCPCS | Performed by: FAMILY MEDICINE

## 2024-07-16 PROCEDURE — 99214 OFFICE O/P EST MOD 30 MIN: CPT | Performed by: FAMILY MEDICINE

## 2024-07-16 NOTE — PATIENT INSTRUCTIONS
Obtain cologuard  Message neurology  Refer to Dr. Yeager  Obtain labs      Medicare Preventive Visit Patient Instructions  Thank you for completing your Welcome to Medicare Visit or Medicare Annual Wellness Visit today. Your next wellness visit will be due in one year (7/17/2025).  The screening/preventive services that you may require over the next 5-10 years are detailed below. Some tests may not apply to you based off risk factors and/or age. Screening tests ordered at today's visit but not completed yet may show as past due. Also, please note that scanned in results may not display below.  Preventive Screenings:  Service Recommendations Previous Testing/Comments   Colorectal Cancer Screening  * Colonoscopy    * Fecal Occult Blood Test (FOBT)/Fecal Immunochemical Test (FIT)  * Fecal DNA/Cologuard Test  * Flexible Sigmoidoscopy Age: 45-75 years old   Colonoscopy: every 10 years (may be performed more frequently if at higher risk)  OR  FOBT/FIT: every 1 year  OR  Cologuard: every 3 years  OR  Sigmoidoscopy: every 5 years  Screening may be recommended earlier than age 45 if at higher risk for colorectal cancer. Also, an individualized decision between you and your healthcare provider will decide whether screening between the ages of 76-85 would be appropriate. Colonoscopy: Not on file  FOBT/FIT: Not on file  Cologuard: Not on file  Sigmoidoscopy: Not on file          Breast Cancer Screening Age: 40+ years old  Frequency: every 1-2 years  Not required if history of left and right mastectomy Mammogram: 08/23/2023    History Breast Cancer   Cervical Cancer Screening Between the ages of 21-29, pap smear recommended once every 3 years.   Between the ages of 30-65, can perform pap smear with HPV co-testing every 5 years.   Recommendations may differ for women with a history of total hysterectomy, cervical cancer, or abnormal pap smears in past. Pap Smear: Not on file    Screening Not Indicated   Hepatitis C Screening Once  for adults born between 1945 and 1965  More frequently in patients at high risk for Hepatitis C Hep C Antibody: 07/13/2022    Screening Current   Diabetes Screening 1-2 times per year if you're at risk for diabetes or have pre-diabetes Fasting glucose: 93 mg/dL (7/18/2023)  A1C: 5.7 % (7/18/2023)  Screening Current   Cholesterol Screening Once every 5 years if you don't have a lipid disorder. May order more often based on risk factors. Lipid panel: 07/18/2023    Screening Not Indicated  History Lipid Disorder     Other Preventive Screenings Covered by Medicare:  Abdominal Aortic Aneurysm (AAA) Screening: covered once if your at risk. You're considered to be at risk if you have a family history of AAA.  Lung Cancer Screening: covers low dose CT scan once per year if you meet all of the following conditions: (1) Age 55-77; (2) No signs or symptoms of lung cancer; (3) Current smoker or have quit smoking within the last 15 years; (4) You have a tobacco smoking history of at least 20 pack years (packs per day multiplied by number of years you smoked); (5) You get a written order from a healthcare provider.  Glaucoma Screening: covered annually if you're considered high risk: (1) You have diabetes OR (2) Family history of glaucoma OR (3)  aged 50 and older OR (4)  American aged 65 and older  Osteoporosis Screening: covered every 2 years if you meet one of the following conditions: (1) You're estrogen deficient and at risk for osteoporosis based off medical history and other findings; (2) Have a vertebral abnormality; (3) On glucocorticoid therapy for more than 3 months; (4) Have primary hyperparathyroidism; (5) On osteoporosis medications and need to assess response to drug therapy.   Last bone density test (DXA Scan): 07/22/2022.  HIV Screening: covered annually if you're between the age of 15-65. Also covered annually if you are younger than 15 and older than 65 with risk factors for HIV  infection. For pregnant patients, it is covered up to 3 times per pregnancy.    Immunizations:  Immunization Recommendations   Influenza Vaccine Annual influenza vaccination during flu season is recommended for all persons aged >= 6 months who do not have contraindications   Pneumococcal Vaccine   * Pneumococcal conjugate vaccine = PCV13 (Prevnar 13), PCV15 (Vaxneuvance), PCV20 (Prevnar 20)  * Pneumococcal polysaccharide vaccine = PPSV23 (Pneumovax) Adults 19-65 yo with certain risk factors or if 65+ yo  If never received any pneumonia vaccine: recommend Prevnar 20 (PCV20)  Give PCV20 if previously received 1 dose of PCV13 or PPSV23   Hepatitis B Vaccine 3 dose series if at intermediate or high risk (ex: diabetes, end stage renal disease, liver disease)   Respiratory syncytial virus (RSV) Vaccine - COVERED BY MEDICARE PART D  * RSVPreF3 (Arexvy) CDC recommends that adults 60 years of age and older may receive a single dose of RSV vaccine using shared clinical decision-making (SCDM)   Tetanus (Td) Vaccine - COST NOT COVERED BY MEDICARE PART B Following completion of primary series, a booster dose should be given every 10 years to maintain immunity against tetanus. Td may also be given as tetanus wound prophylaxis.   Tdap Vaccine - COST NOT COVERED BY MEDICARE PART B Recommended at least once for all adults. For pregnant patients, recommended with each pregnancy.   Shingles Vaccine (Shingrix) - COST NOT COVERED BY MEDICARE PART B  2 shot series recommended in those 19 years and older who have or will have weakened immune systems or those 50 years and older     Health Maintenance Due:      Topic Date Due    Colorectal Cancer Screening  03/26/2024    Breast Cancer Screening: Mammogram  08/23/2024    DXA SCAN  07/22/2027    Hepatitis C Screening  Completed     Immunizations Due:      Topic Date Due    Pneumococcal Vaccine: 65+ Years (1 of 1 - PCV) Never done    COVID-19 Vaccine (1 - 2023-24 season) Never done     Influenza Vaccine (1) 09/01/2024     Advance Directives   What are advance directives?  Advance directives are legal documents that state your wishes and plans for medical care. These plans are made ahead of time in case you lose your ability to make decisions for yourself. Advance directives can apply to any medical decision, such as the treatments you want, and if you want to donate organs.   What are the types of advance directives?  There are many types of advance directives, and each state has rules about how to use them. You may choose a combination of any of the following:  Living will:  This is a written record of the treatment you want. You can also choose which treatments you do not want, which to limit, and which to stop at a certain time. This includes surgery, medicine, IV fluid, and tube feedings.   Durable power of  for healthcare (DPAHC):  This is a written record that states who you want to make healthcare choices for you when you are unable to make them for yourself. This person, called a proxy, is usually a family member or a friend. You may choose more than 1 proxy.  Do not resuscitate (DNR) order:  A DNR order is used in case your heart stops beating or you stop breathing. It is a request not to have certain forms of treatment, such as CPR. A DNR order may be included in other types of advance directives.  Medical directive:  This covers the care that you want if you are in a coma, near death, or unable to make decisions for yourself. You can list the treatments you want for each condition. Treatment may include pain medicine, surgery, blood transfusions, dialysis, IV or tube feedings, and a ventilator (breathing machine).  Values history:  This document has questions about your views, beliefs, and how you feel and think about life. This information can help others choose the care that you would choose.  Why are advance directives important?  An advance directive helps you control your  care. Although spoken wishes may be used, it is better to have your wishes written down. Spoken wishes can be misunderstood, or not followed. Treatments may be given even if you do not want them. An advance directive may make it easier for your family to make difficult choices about your care.       © Copyright [employer] 2018 Information is for End User's use only and may not be sold, redistributed or otherwise used for commercial purposes. All illustrations and images included in CareNotes® are the copyrighted property of [employer] or [employer]

## 2024-07-16 NOTE — PROGRESS NOTES
"Ambulatory Visit  Name: Fernanda Winston      : 1950      MRN: [id number]  Encounter Provider: Sandy Ruelas DO  Encounter Date: 2024   Encounter department: [facility]    Assessment & Plan   1. Medicare annual wellness visit, subsequent  Comments:  declines imm's and dexa at this time  check cologuard  2. Screening for lipoid disorders  -     Lipid panel; Future  3. Screening for colorectal cancer  -     Cologuard  4. PAF (paroxysmal atrial fibrillation) (HCC)  Comments:  following with HCG  bp appropriate  Orders:  -     TSH, 3rd generation; Future  5. SVT (supraventricular tachycardia)  Comments:  following with hcg  Orders:  -     Comprehensive metabolic panel; Future  -     CBC and differential; Future  6. Malignant neoplasm of upper-outer quadrant of left breast in female, estrogen receptor positive (HCC)  Comments:  refer to dr gomes as pt would like to see new surgeon  7. History of breast cancer  -     Ambulatory Referral to Breast Surgery; Future  8. IFG (impaired fasting glucose)  Comments:  check labs as ordered  Orders:  -     Hemoglobin A1C; Future       Preventive health issues were discussed with patient, and age appropriate screening tests were ordered as noted in patient's After Visit Summary. Personalized health advice and appropriate referrals for health education or preventive services given if needed, as noted in patient's After Visit Summary.    History of Present Illness     HPI   Pt presents for MAW and f/u    From a preventive standpoint, due for crc screening.  Due for labs.  Pt declines all immunizations.  Has living will.  Very active at work.  Sees dental.  Doesn't want a dexa right now.    From a f/u standpoint, pt has didn't get US last year because \"radiologist at [facility] refused to get it.\"  Did get mammogram which looked okay.  Pt notices an area of hardness at rear of L breast.  She did not follow-up with surg-onc over at [facility].  Is seeing h/o but " she declines any chemo.  She'd like to see a new surgeon    Cervical dystonia not really being helped by baclofen.  Wonders about other meds    Blood pressure appropriate on current meds.  No chest pain, shortness of breath, n/v, abd pain, visual changes, paresthesias, weakness        Patient Care Team:  Sandy Ruelas DO as PCP - General (Family Medicine)    Review of Systems   Constitutional:  Negative for chills, fatigue, fever and unexpected weight change.   HENT:  Negative for congestion, ear pain, hearing loss, postnasal drip, rhinorrhea, sinus pressure, sinus pain, sore throat, trouble swallowing and voice change.    Eyes:  Negative for pain, redness and visual disturbance.   Respiratory:  Negative for cough and shortness of breath.    Cardiovascular:  Negative for chest pain, palpitations and leg swelling.   Gastrointestinal:  Negative for abdominal pain, constipation, diarrhea and nausea.   Endocrine: Negative for cold intolerance, heat intolerance, polydipsia and polyuria.   Genitourinary:  Negative for dysuria, frequency and urgency.   Musculoskeletal:  Negative for arthralgias, joint swelling and myalgias.   Skin:  Negative for rash.        No suspicious lesions   Neurological:  Negative for weakness, numbness and headaches.   Hematological:  Negative for adenopathy.     Medical History Reviewed by provider this encounter:       Annual Wellness Visit Questionnaire   Fernanda is here for her Subsequent Wellness visit.     Health Risk Assessment:   Patient rates overall health as very good. Patient feels that their physical health rating is same. Patient is satisfied with their life. Eyesight was rated as slightly worse. Hearing was rated as slightly worse. Patient feels that their emotional and mental health rating is same. Patients states they are never, rarely angry. Patient states they are sometimes unusually tired/fatigued. Pain experienced in the last 7 days has been none. Patient states that she has  experienced no weight loss or gain in last 6 months.     Depression Screening:   PHQ-2 Score: 0      Fall Risk Screening:   In the past year, patient has experienced: no history of falling in past year      Urinary Incontinence Screening:   Patient has not leaked urine accidently in the last six months.     Home Safety:  Patient does not have trouble with stairs inside or outside of their home. Patient has working smoke alarms and has working carbon monoxide detector. Home safety hazards include: none.     Nutrition:   Current diet is Regular.     Medications:   Patient is not currently taking any over-the-counter supplements. Patient is able to manage medications.     Activities of Daily Living (ADLs)/Instrumental Activities of Daily Living (IADLs):   Walk and transfer into and out of bed and chair?: Yes  Dress and groom yourself?: Yes    Bathe or shower yourself?: Yes    Feed yourself? Yes  Do your laundry/housekeeping?: Yes  Manage your money, pay your bills and track your expenses?: Yes  Make your own meals?: Yes    Do your own shopping?: Yes    Previous Hospitalizations:   Any hospitalizations or ED visits within the last 12 months?: No      Advance Care Planning:   Living will: Yes    Durable POA for healthcare: Yes    Advanced directive: Yes      Cognitive Screening:   Provider or family/friend/caregiver concerned regarding cognition?: No    PREVENTIVE SCREENINGS      Cardiovascular Screening:    General: Screening Not Indicated and History Lipid Disorder      Diabetes Screening:     General: Screening Current      Breast Cancer Screening:     General: History Breast Cancer      Cervical Cancer Screening:    General: Screening Not Indicated      Osteoporosis Screening:    General: Screening Current      Lung Cancer Screening:     General: Screening Not Indicated      Hepatitis C Screening:    General: Screening Current    Screening, Brief Intervention, and Referral to Treatment (SBIRT)    Screening  Typical  number of drinks in a day: 0  Typical number of drinks in a week: 0  Interpretation: Low risk drinking behavior.    AUDIT-C Screenin) How often did you have a drink containing alcohol in the past year? never  2) How many drinks did you have on a typical day when you were drinking in the past year? 0  3) How often did you have 6 or more drinks on one occasion in the past year? never    AUDIT-C Score: 0  Interpretation: Score 0-2 (female): Negative screen for alcohol misuse    Single Item Drug Screening:  How often have you used an illegal drug (including marijuana) or a prescription medication for non-medical reasons in the past year? never    Single Item Drug Screen Score: 0  Interpretation: Negative screen for possible drug use disorder    Social Determinants of Health     Financial Resource Strain: Low Risk  (2023)    Overall Financial Resource Strain (CARDIA)     Difficulty of Paying Living Expenses: Not hard at all   Food Insecurity: No Food Insecurity (2024)    Hunger Vital Sign     Worried About Running Out of Food in the Last Year: Never true     Ran Out of Food in the Last Year: Never true   Transportation Needs: No Transportation Needs (2024)    PRAPARE - Transportation     Lack of Transportation (Medical): No     Lack of Transportation (Non-Medical): No   Housing Stability: Low Risk  (2024)    Housing Stability Vital Sign     Unable to Pay for Housing in the Last Year: No     Number of Times Moved in the Last Year: 0     Homeless in the Last Year: No   Utilities: Not At Risk (2024)    [facility] Utilities     Threatened with loss of utilities: No     No results found.    Objective     /84 (BP Location: Left arm, Patient Position: Sitting, Cuff Size: Standard)   Pulse 67   Temp 98.2 °F (36.8 °C)   Resp 16   Ht 5' (1.524 m)   Wt 55.3 kg (122 lb)   SpO2 98%   BMI 23.83 kg/m²     Physical Exam  Constitutional:       Appearance: Normal appearance.   HENT:      Head: Normocephalic  and atraumatic.      Right Ear: Tympanic membrane, ear canal and external ear normal.      Left Ear: Tympanic membrane, ear canal and external ear normal.      Nose: Nose normal. No congestion.      Mouth/Throat:      Mouth: Mucous membranes are moist.      Pharynx: No oropharyngeal exudate or posterior oropharyngeal erythema.   Eyes:      Extraocular Movements: Extraocular movements intact.      Conjunctiva/sclera: Conjunctivae normal.      Pupils: Pupils are equal, round, and reactive to light.   Neck:      Vascular: No carotid bruit.   Cardiovascular:      Rate and Rhythm: Normal rate and regular rhythm.      Heart sounds: No murmur heard.     No friction rub. No gallop.   Pulmonary:      Effort: Pulmonary effort is normal.      Breath sounds: No wheezing, rhonchi or rales.   Abdominal:      General: Abdomen is flat. There is no distension.      Palpations: Abdomen is soft.      Tenderness: There is no abdominal tenderness.   Musculoskeletal:      Cervical back: Neck supple.   Lymphadenopathy:      Cervical: No cervical adenopathy.   Neurological:      General: No focal deficit present.      Mental Status: She is alert and oriented to person, place, and time.      Cranial Nerves: No cranial nerve deficit.      Motor: No weakness.      Deep Tendon Reflexes: Reflexes normal.      Comments: +cervial dystonia and dystonic head tremor

## 2024-08-01 ENCOUNTER — DOCUMENTATION (OUTPATIENT)
Dept: HEMATOLOGY ONCOLOGY | Facility: CLINIC | Age: 74
End: 2024-08-01

## 2024-08-01 NOTE — PROGRESS NOTES
Referral received/ Chart reviewed for work up completed     Imaging completed:  02/10/17 Mammo/US diag,  completed at [facility]    Pathology completed:  02/14/2017, 03/29/2017, 07/21/2022, 09/19/2022 at [facility] accession #[id number]    PN please retrieve outside records.             Smoking even a single puff increases the likelihood of a full relapse, withdrawal symptoms peak within 1-2 weeks, but can persist for months

## 2024-08-03 LAB — COLOGUARD RESULT REPORTABLE: NEGATIVE

## 2024-08-05 ENCOUNTER — PATIENT OUTREACH (OUTPATIENT)
Dept: HEMATOLOGY ONCOLOGY | Facility: CLINIC | Age: 74
End: 2024-08-05

## 2024-08-05 NOTE — PROGRESS NOTES
Intake received, chart reviewed for need of external records.  Consulting: Dr. Yeager  Scheduled on 10/14/24    Pathology requested:   From [facility] via fax [phone], phone [phone]  Accession # 2/14/17, 3/29/17, 7/21/22 and 9/19/22  Slides will be sent directly to [facility] Pathology lab at [address] [place] [address].     Images requested:  From [facility] via fax [phone], phone [phone]  If not uploaded electronically via [employer], disks will be sent directly to the Radiology Reading Room.

## 2024-08-07 ENCOUNTER — PATIENT OUTREACH (OUTPATIENT)
Dept: HEMATOLOGY ONCOLOGY | Facility: CLINIC | Age: 74
End: 2024-08-07

## 2024-08-07 NOTE — PROGRESS NOTES
I did outreach [facility] to follow up on Pathology slide request made on 8/5/24  I spoke to Vickie and she states that she did receive the request and it is currently being processed.    I did outreach [facility] to follow up on request made for Images on 8/5/24  I did leave a voice message with my contact information requesting a call back for confirmation.  I did receive a call back from Nolan, she left a Voicemail     Hi Abbey, this is Nolan from [facility]. I'm giving you a call back in reference to Fernanda Winston, date of birth [birth year]. Those images were pushed over to you on August 5th from [facility] to [facility]. Again, I did push all of our images from [facility] over to [facility] on August 5th for Fernanda Winston. Thank you and have a good day.  You received a voice mail from [facility].

## 2024-08-14 ENCOUNTER — LAB REQUISITION (OUTPATIENT)
Dept: LAB | Facility: HOSPITAL | Age: 74
End: 2024-08-14
Payer: MEDICARE

## 2024-08-14 DIAGNOSIS — C50.412 MALIGNANT NEOPLASM OF UPPER-OUTER QUADRANT OF LEFT FEMALE BREAST (HCC): ICD-10-CM

## 2024-08-17 PROCEDURE — 88321 CONSLTJ&REPRT SLD PREP ELSWR: CPT | Performed by: PATHOLOGY

## 2024-08-27 ENCOUNTER — TELEPHONE (OUTPATIENT)
Age: 74
End: 2024-08-27

## 2024-08-27 DIAGNOSIS — Z85.3 HISTORY OF BREAST CANCER: Primary | ICD-10-CM

## 2024-08-27 DIAGNOSIS — Z17.0 MALIGNANT NEOPLASM OF UPPER-OUTER QUADRANT OF LEFT BREAST IN FEMALE, ESTROGEN RECEPTOR POSITIVE (HCC): ICD-10-CM

## 2024-08-27 DIAGNOSIS — C50.412 MALIGNANT NEOPLASM OF UPPER-OUTER QUADRANT OF LEFT BREAST IN FEMALE, ESTROGEN RECEPTOR POSITIVE (HCC): ICD-10-CM

## 2024-08-27 NOTE — TELEPHONE ENCOUNTER
Faxed script to fax number that was provided to us on 8/27/24. Received confirmation of completion at 6:00 pm.

## 2024-08-27 NOTE — TELEPHONE ENCOUNTER
Call received [facility]. Pt is coming to see them on 8/30 and they need a script sent over to them. B/L diagnostic mammogram. Please fax to [phone].

## 2024-08-27 NOTE — ADDENDUM NOTE
Addended by: ROBERTH GROSSMAN on: 8/27/2024 04:20 PM     Modules accepted: Orders     20 degrees trunk flexion maintained throughout

## 2024-09-07 LAB
ALBUMIN SERPL-MCNC: 4.1 G/DL (ref 3.5–5.7)
ALP SERPL-CCNC: 66 U/L (ref 35–120)
ALT SERPL-CCNC: 23 U/L
ANION GAP SERPL CALCULATED.3IONS-SCNC: 6 MMOL/L (ref 3–11)
AST SERPL-CCNC: 21 U/L
BASOPHILS # BLD AUTO: 0 THOU/CMM (ref 0–0.1)
BASOPHILS NFR BLD AUTO: 1 %
BILIRUB SERPL-MCNC: 0.9 MG/DL (ref 0.2–1)
BUN SERPL-MCNC: 25 MG/DL (ref 7–25)
CALCIUM SERPL-MCNC: 9 MG/DL (ref 8.5–10.1)
CHLORIDE SERPL-SCNC: 106 MMOL/L (ref 100–109)
CHOLEST SERPL-MCNC: 165 MG/DL
CHOLEST/HDLC SERPL: 2.6 {RATIO}
CO2 SERPL-SCNC: 30 MMOL/L (ref 21–31)
CREAT SERPL-MCNC: 0.73 MG/DL (ref 0.4–1.1)
CYTOLOGY CMNT CVX/VAG CYTO-IMP: ABNORMAL
DIFFERENTIAL METHOD BLD: ABNORMAL
EOSINOPHIL # BLD AUTO: 0 THOU/CMM (ref 0–0.5)
EOSINOPHIL NFR BLD AUTO: 0 %
ERYTHROCYTE [DISTWIDTH] IN BLOOD BY AUTOMATED COUNT: 13.9 % (ref 12–16)
EST. AVERAGE GLUCOSE BLD GHB EST-MCNC: 120 MG/DL
GFR/BSA.PRED SERPLBLD CYS-BASED-ARV: 86 ML/MIN/{1.73_M2}
GLUCOSE SERPL-MCNC: 81 MG/DL (ref 65–99)
HBA1C MFR BLD: 5.8 %
HCT VFR BLD AUTO: 39.5 % (ref 35–43)
HDLC SERPL-MCNC: 63 MG/DL (ref 23–92)
HGB BLD-MCNC: 13.1 G/DL (ref 11.5–14.5)
LDLC SERPL CALC-MCNC: 92 MG/DL
LYMPHOCYTES # BLD AUTO: 1.8 THOU/CMM (ref 1–3)
LYMPHOCYTES NFR BLD AUTO: 34 %
MCH RBC QN AUTO: 27.9 PG (ref 26–34)
MCHC RBC AUTO-ENTMCNC: 33.3 G/DL (ref 32–37)
MCV RBC AUTO: 84 FL (ref 80–100)
MONOCYTES # BLD AUTO: 0.6 THOU/CMM (ref 0.3–1)
MONOCYTES NFR BLD AUTO: 11 %
NEUTROPHILS # BLD AUTO: 2.8 THOU/CMM (ref 1.8–7.8)
NEUTROPHILS NFR BLD AUTO: 54 %
NONHDLC SERPL-MCNC: 102 MG/DL
PLATELET # BLD AUTO: 207 THOU/CMM (ref 140–350)
PMV BLD REES-ECKER: 9.5 FL (ref 7.5–11.3)
POTASSIUM SERPL-SCNC: 4.3 MMOL/L (ref 3.5–5.2)
PROT SERPL-MCNC: 6.2 G/DL (ref 6.3–8.3)
RBC # BLD AUTO: 4.71 MILL/CMM (ref 3.7–4.7)
SODIUM SERPL-SCNC: 142 MMOL/L (ref 135–145)
TRIGL SERPL-MCNC: 48 MG/DL
TSH SERPL-ACNC: 0.48 UIU/ML (ref 0.45–5.33)
WBC # BLD AUTO: 5.3 THOU/CMM (ref 4–10)

## 2024-09-13 ENCOUNTER — TELEPHONE (OUTPATIENT)
Dept: HEMATOLOGY ONCOLOGY | Facility: CLINIC | Age: 74
End: 2024-09-13

## 2024-09-13 NOTE — TELEPHONE ENCOUNTER
Patient is currently scheduled for a Surgical Oncology consult with Dr. Yeager on  10- . I called to offer a sooner consult appointment. Patient will be cancelling her consult at this time. She is scheduled to consult with Dr. Davis on 9-. Patient will call back if she decides to have a 2nd opinion. She had no other questions or concerns at this time. I encouraged her to call should any arise.

## 2024-11-07 ENCOUNTER — OFFICE VISIT (OUTPATIENT)
Dept: NEUROLOGY | Facility: CLINIC | Age: 74
End: 2024-11-07
Payer: MEDICARE

## 2024-11-07 ENCOUNTER — TELEPHONE (OUTPATIENT)
Age: 74
End: 2024-11-07

## 2024-11-07 VITALS
HEIGHT: 60 IN | SYSTOLIC BLOOD PRESSURE: 122 MMHG | DIASTOLIC BLOOD PRESSURE: 70 MMHG | WEIGHT: 128 LBS | OXYGEN SATURATION: 99 % | HEART RATE: 65 BPM | TEMPERATURE: 97.8 F | BODY MASS INDEX: 25.13 KG/M2

## 2024-11-07 DIAGNOSIS — G24.3 CERVICAL DYSTONIA: Primary | ICD-10-CM

## 2024-11-07 PROCEDURE — 99214 OFFICE O/P EST MOD 30 MIN: CPT | Performed by: PSYCHIATRY & NEUROLOGY

## 2024-11-07 RX ORDER — OMEGA-3S/DHA/EPA/FISH OIL/D3 300MG-1000
400 CAPSULE ORAL DAILY
COMMUNITY

## 2024-11-07 RX ORDER — GABAPENTIN 100 MG/1
CAPSULE ORAL
Qty: 126 CAPSULE | Refills: 2 | Status: SHIPPED | OUTPATIENT
Start: 2024-11-07 | End: 2024-12-05

## 2024-11-07 RX ORDER — TRAMADOL HYDROCHLORIDE 50 MG/1
50 TABLET ORAL EVERY 6 HOURS PRN
COMMUNITY
Start: 2024-10-07 | End: 2024-11-07 | Stop reason: CLARIF

## 2024-11-07 NOTE — TELEPHONE ENCOUNTER
11/07/24    Patient daughter called office to notify that patient was on her way, that she was on [address].     Informed daughter that I will contact the office and notify them.    Made patient daughter aware to not be alarm if she arrives late and she is told that she haves to reschedule. Patient daughter UNDERSTOOD and AGREED.     I MADE NO PROMISES.      I called the [place] office, No Answer.    Reached out through TEAMS to Dr. Eugene WHITE Miss. Jose and gave her the heads Up.       Any questions, please contact Patient or the [place] office.  Thank You.

## 2024-11-07 NOTE — PATIENT INSTRUCTIONS
Baclofen with no improvement on 10mg bid and sedation so tapered off.   We reviewed treatment options including medications (baclofen, trihexyphenidyl) and neurotoxin injections to muscles of the neck. We discussed the potential benefits and risks of neurotoxin injections . Injections are to treat symptoms.  Potential side effects such as excessive weakness, spread of toxin causing dysphagia, infection and bleeding discussed.   Gabapentin seemed to help after surgery so wishes to try. This can be helpful in some with dystonia.  Will start gabapentin 100mg twice daily x 1 week the 200mg twice daily for 1 week then 300mg twice daily.  Contact the office in 4 weeks with update on whether this is helping. If ineffective will further increase if tolerated. Goal is to remain on lowest effective dose.

## 2024-11-07 NOTE — ASSESSMENT & PLAN NOTE
Progressive jerky head tremors over 5 years with mild left torticollis and right laterocollis.  Jerking signal tremor.  Today reports will sometimes place this underneath chin which may help tremor could possibly be sensory trick.     She was on baclofen with no improvement on 10mg bid and sedation so she tapered off.     We reviewed treatment options including medications (baclofen, trihexyphenidyl) and neurotoxin injections to muscles of the neck. We discussed the potential benefits and risks of neurotoxin injections . Injections are to treat symptoms.  Potential side effects such as excessive weakness, spread of toxin causing dysphagia, infection and bleeding discussed.     Gabapentin seemed to help after surgery so wishes to try. This can be helpful in some with dystonia.  Will start gabapentin 100mg twice daily x 1 week the 200mg twice daily for 1 week then 300mg twice daily.  Contact the office in 4 weeks with update on whether this is helping. If ineffective will further increase if tolerated. Goal is to remain on lowest effective dose.    Orders:    gabapentin (Neurontin) 100 mg capsule; Take 1 capsule (100 mg total) by mouth 2 (two) times a day for 7 days, THEN 2 capsules (200 mg total) 2 (two) times a day for 7 days, THEN 3 capsules (300 mg total) 2 (two) times a day for 14 days. (Patient not taking: Reported on 11/7/2024)

## 2024-11-07 NOTE — PROGRESS NOTES
Ambulatory Visit  Name: Fernanda Winston      : 1950      MRN: [id number]  Encounter Provider: Cindy Adame MD  Encounter Date: 2024   Encounter department: [facility] [place]    Assessment & Plan  Cervical dystonia  Progressive jerky head tremors over 5 years with mild left torticollis and right laterocollis.  Jerking signal tremor.  Today reports will sometimes place this underneath chin which may help tremor could possibly be sensory trick.     She was on baclofen with no improvement on 10mg bid and sedation so she tapered off.     We reviewed treatment options including medications (baclofen, trihexyphenidyl) and neurotoxin injections to muscles of the neck. We discussed the potential benefits and risks of neurotoxin injections . Injections are to treat symptoms.  Potential side effects such as excessive weakness, spread of toxin causing dysphagia, infection and bleeding discussed.     Gabapentin seemed to help after surgery so wishes to try. This can be helpful in some with dystonia.  Will start gabapentin 100mg twice daily x 1 week the 200mg twice daily for 1 week then 300mg twice daily.  Contact the office in 4 weeks with update on whether this is helping. If ineffective will further increase if tolerated. Goal is to remain on lowest effective dose.    Orders:    gabapentin (Neurontin) 100 mg capsule; Take 1 capsule (100 mg total) by mouth 2 (two) times a day for 7 days, THEN 2 capsules (200 mg total) 2 (two) times a day for 7 days, THEN 3 capsules (300 mg total) 2 (two) times a day for 14 days. (Patient not taking: Reported on 2024)        History of Present Illness   Fernanda Winston is a 74 year old woman with HTN, HLD, breast cancer recurrence,  who presents for movement follow-up for cervical dystonia.    Longstanding history of head tremor over the past 5 years which is slowly worsened. Diagnosed with cervical dystonia when initially seen May 2024 with  mild left torticollis, mild right laterocollis, and diagonal jerky head tremor.  Concern continues to be ways to control tremor.  She did try baclofen and titrated up to 10 mg twice a day but is experiencing sedation did not feel it was effective so she tapered off about a month ago.  She is still conscious about the tremor itself.  She does notice some tightness of the muscles of the left shoulder and neck.  No tremor of the hand or legs. No vocal tremor. No changes in gait.      She is active. 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 She walks 6 miles.   Maternal cousin and maternal aunt have Parkinson's disease. No other FH of tremors or movement disorder.      MRI cervical spine- 2015-    Reversal of cervical lordosis.   Significant disc space narrowing with anterior osteophytes of C4-C5,   C5-C6 and C6-C7 with some mild spinal canal narrowing and   neuroforaminal narrowing as described above.           Review of Systems  I have personally reviewed the MA's review of systems and made changes as necessary.      Objective     /70 (BP Location: Right arm, Patient Position: Sitting, Cuff Size: Adult)   Pulse 65   Temp 97.8 °F (36.6 °C) (Temporal)   Ht 5' (1.524 m)   Wt 58.1 kg (128 lb)   SpO2 99%   BMI 25.00 kg/m²     Physical Exam  Vitals reviewed.   Eyes:      Extraocular Movements: EOM normal.      Pupils: Pupils are equal, round, and reactive to light.   Neurological:      Mental Status: She is oriented to person, place, and time.      Motor: Motor strength is normal.     Coordination: Finger-Nose-Finger Test normal.      Gait: Gait is intact.   Psychiatric:         Speech: Speech normal.       Neurologic Exam     Mental Status   Oriented to person, place, and time.   Attention: normal. Concentration: normal.   Speech: speech is normal   Knowledge: good.     Cranial Nerves     CN III, IV, VI   Pupils are equal, round, and reactive to light.  Extraocular motions are normal.     CN VII   Facial  expression full, symmetric.     CN VIII   CN VIII normal.     CN IX, X   Palate: symmetric    CN XI   Right trapezius strength: normal  Left trapezius strength: normal    CN XII   Tongue deviation: none    Motor Exam   Overall muscle tone: normal    Strength   Strength 5/5 throughout. Mild left torticollis and mild right laterocollis with jerky spasmodic tremor.  Tremors mild but persistent.  Possible sensory trick of touching chin.  No limitations in range of motion.  Spasm over the left trap.     Sensory Exam   Light touch normal.     Gait, Coordination, and Reflexes     Gait  Gait: normal    Coordination   Finger to nose coordination: normal            Administrative Statements   I have spent a total time of 32 minutes in caring for this patient on the day of the visit/encounter including Risks and benefits of tx options, Patient and family education, Counseling / Coordination of care, Documenting in the medical record, and Obtaining or reviewing history  .

## 2025-01-17 DIAGNOSIS — G24.3 CERVICAL DYSTONIA: ICD-10-CM

## 2025-01-17 NOTE — TELEPHONE ENCOUNTER
Reason for call:   [x] Refill   [] Prior Auth  [] Other:     Office:   [] PCP/Provider -   [x] Specialty/Provider -     Medication:     gabapentin (Neurontin) 100 mg capsule        Dose/Frequency: Take 1 capsule (100 mg total) by mouth 2 (two) times a day for 7 days, THEN 2 capsules (200 mg total) 2 (two) times a day for 7 days, THEN 3 capsules (300 mg total) 2 (two) times a day for 14 days     Quantity: 126 caps    [facility] [address] [facility]     Does the patient have enough for 3 days?   [] Yes   [] No - Send as HP to POD

## 2025-01-20 NOTE — TELEPHONE ENCOUNTER
Patients daughter Jacqueline called rx refill line inquiring status on Gabapentin request. Informed request remains pending. Due to information within encounter, Jacqueline was going to contact patient to obtain further information.

## 2025-01-20 NOTE — TELEPHONE ENCOUNTER
[place] (Daughter)  [phone] ([place])      [place] Jacqueline (Daughter)  [phone] ([place])      600mg per day ( 300mg Am & 300mg PM)   Patient is out of medication   And patient has had no issue with current dose    Pharmacy: [facility] - [address] [facility]     Needs        Neurology [facility] - Cindy Bryant MD

## 2025-01-21 DIAGNOSIS — G24.3 CERVICAL DYSTONIA: ICD-10-CM

## 2025-01-21 RX ORDER — GABAPENTIN 100 MG/1
300 CAPSULE ORAL 2 TIMES DAILY
Qty: 60 CAPSULE | Refills: 8 | Status: SHIPPED | OUTPATIENT
Start: 2025-01-21 | End: 2025-01-21 | Stop reason: SDUPTHER

## 2025-01-21 RX ORDER — GABAPENTIN 300 MG/1
300 CAPSULE ORAL 2 TIMES DAILY
Qty: 60 CAPSULE | Refills: 8 | Status: SHIPPED | OUTPATIENT
Start: 2025-01-21

## 2025-04-21 ENCOUNTER — TELEPHONE (OUTPATIENT)
Age: 75
End: 2025-04-21

## 2025-04-21 NOTE — TELEPHONE ENCOUNTER
Unfortunately, this is not something we do.  The designation is not something I can give.  I can write a letter that saying her dog provides emotional support and should be permitted to stay with her for medical reasons, but that is all I can do.  Let her know a note is in the chart.

## 2025-04-21 NOTE — TELEPHONE ENCOUNTER
Pt would like it PCP can write a letter stating that her [place] hensley is an emotional support dog. She is looking to buy a home in a community that does not allow pets unless PCP writes a letter. PT needs this ASAP if possible as she in in the middle of the process to purchase the home.